# Patient Record
Sex: MALE | Race: OTHER | HISPANIC OR LATINO | URBAN - METROPOLITAN AREA
[De-identification: names, ages, dates, MRNs, and addresses within clinical notes are randomized per-mention and may not be internally consistent; named-entity substitution may affect disease eponyms.]

---

## 2019-03-04 ENCOUNTER — EMERGENCY (EMERGENCY)
Facility: HOSPITAL | Age: 54
LOS: 1 days | Discharge: HOME | End: 2019-03-04
Attending: EMERGENCY MEDICINE | Admitting: EMERGENCY MEDICINE
Payer: MEDICAID

## 2019-03-04 VITALS
OXYGEN SATURATION: 100 % | TEMPERATURE: 98 F | DIASTOLIC BLOOD PRESSURE: 88 MMHG | HEART RATE: 95 BPM | RESPIRATION RATE: 18 BRPM | SYSTOLIC BLOOD PRESSURE: 161 MMHG

## 2019-03-04 LAB
ALBUMIN SERPL ELPH-MCNC: 4.1 G/DL — SIGNIFICANT CHANGE UP (ref 3.3–5)
ALP SERPL-CCNC: 55 U/L — SIGNIFICANT CHANGE UP (ref 40–120)
ALT FLD-CCNC: 33 U/L — SIGNIFICANT CHANGE UP (ref 4–41)
ANION GAP SERPL CALC-SCNC: 11 MMO/L — SIGNIFICANT CHANGE UP (ref 7–14)
APPEARANCE UR: CLEAR — SIGNIFICANT CHANGE UP
AST SERPL-CCNC: 14 U/L — SIGNIFICANT CHANGE UP (ref 4–40)
BASOPHILS # BLD AUTO: 0.03 K/UL — SIGNIFICANT CHANGE UP (ref 0–0.2)
BASOPHILS NFR BLD AUTO: 0.5 % — SIGNIFICANT CHANGE UP (ref 0–2)
BILIRUB SERPL-MCNC: 0.5 MG/DL — SIGNIFICANT CHANGE UP (ref 0.2–1.2)
BILIRUB UR-MCNC: NEGATIVE — SIGNIFICANT CHANGE UP
BLOOD UR QL VISUAL: NEGATIVE — SIGNIFICANT CHANGE UP
BUN SERPL-MCNC: 15 MG/DL — SIGNIFICANT CHANGE UP (ref 7–23)
CALCIUM SERPL-MCNC: 9.4 MG/DL — SIGNIFICANT CHANGE UP (ref 8.4–10.5)
CHLORIDE SERPL-SCNC: 103 MMOL/L — SIGNIFICANT CHANGE UP (ref 98–107)
CO2 SERPL-SCNC: 28 MMOL/L — SIGNIFICANT CHANGE UP (ref 22–31)
COLOR SPEC: YELLOW — SIGNIFICANT CHANGE UP
CREAT SERPL-MCNC: 0.94 MG/DL — SIGNIFICANT CHANGE UP (ref 0.5–1.3)
EOSINOPHIL # BLD AUTO: 0.17 K/UL — SIGNIFICANT CHANGE UP (ref 0–0.5)
EOSINOPHIL NFR BLD AUTO: 2.7 % — SIGNIFICANT CHANGE UP (ref 0–6)
GLUCOSE SERPL-MCNC: 135 MG/DL — HIGH (ref 70–99)
GLUCOSE UR-MCNC: NEGATIVE — SIGNIFICANT CHANGE UP
HCT VFR BLD CALC: 44.6 % — SIGNIFICANT CHANGE UP (ref 39–50)
HGB BLD-MCNC: 14.4 G/DL — SIGNIFICANT CHANGE UP (ref 13–17)
IMM GRANULOCYTES NFR BLD AUTO: 0.3 % — SIGNIFICANT CHANGE UP (ref 0–1.5)
KETONES UR-MCNC: NEGATIVE — SIGNIFICANT CHANGE UP
LEUKOCYTE ESTERASE UR-ACNC: NEGATIVE — SIGNIFICANT CHANGE UP
LYMPHOCYTES # BLD AUTO: 2.2 K/UL — SIGNIFICANT CHANGE UP (ref 1–3.3)
LYMPHOCYTES # BLD AUTO: 34.6 % — SIGNIFICANT CHANGE UP (ref 13–44)
MCHC RBC-ENTMCNC: 30.1 PG — SIGNIFICANT CHANGE UP (ref 27–34)
MCHC RBC-ENTMCNC: 32.3 % — SIGNIFICANT CHANGE UP (ref 32–36)
MCV RBC AUTO: 93.1 FL — SIGNIFICANT CHANGE UP (ref 80–100)
MONOCYTES # BLD AUTO: 0.52 K/UL — SIGNIFICANT CHANGE UP (ref 0–0.9)
MONOCYTES NFR BLD AUTO: 8.2 % — SIGNIFICANT CHANGE UP (ref 2–14)
NEUTROPHILS # BLD AUTO: 3.42 K/UL — SIGNIFICANT CHANGE UP (ref 1.8–7.4)
NEUTROPHILS NFR BLD AUTO: 53.7 % — SIGNIFICANT CHANGE UP (ref 43–77)
NITRITE UR-MCNC: NEGATIVE — SIGNIFICANT CHANGE UP
NRBC # FLD: 0 K/UL — LOW (ref 25–125)
PH UR: 5.5 — SIGNIFICANT CHANGE UP (ref 5–8)
PLATELET # BLD AUTO: 183 K/UL — SIGNIFICANT CHANGE UP (ref 150–400)
PMV BLD: 12.2 FL — SIGNIFICANT CHANGE UP (ref 7–13)
POTASSIUM SERPL-MCNC: 4.1 MMOL/L — SIGNIFICANT CHANGE UP (ref 3.5–5.3)
POTASSIUM SERPL-SCNC: 4.1 MMOL/L — SIGNIFICANT CHANGE UP (ref 3.5–5.3)
PROT SERPL-MCNC: 6.8 G/DL — SIGNIFICANT CHANGE UP (ref 6–8.3)
PROT UR-MCNC: NEGATIVE — SIGNIFICANT CHANGE UP
RBC # BLD: 4.79 M/UL — SIGNIFICANT CHANGE UP (ref 4.2–5.8)
RBC # FLD: 12.3 % — SIGNIFICANT CHANGE UP (ref 10.3–14.5)
SODIUM SERPL-SCNC: 142 MMOL/L — SIGNIFICANT CHANGE UP (ref 135–145)
SP GR SPEC: 1.03 — SIGNIFICANT CHANGE UP (ref 1–1.04)
UROBILINOGEN FLD QL: NORMAL — SIGNIFICANT CHANGE UP
WBC # BLD: 6.36 K/UL — SIGNIFICANT CHANGE UP (ref 3.8–10.5)
WBC # FLD AUTO: 6.36 K/UL — SIGNIFICANT CHANGE UP (ref 3.8–10.5)

## 2019-03-04 PROCEDURE — 74177 CT ABD & PELVIS W/CONTRAST: CPT | Mod: 26

## 2019-03-04 PROCEDURE — 99284 EMERGENCY DEPT VISIT MOD MDM: CPT

## 2019-03-04 RX ORDER — IBUPROFEN 200 MG
1 TABLET ORAL
Qty: 20 | Refills: 0
Start: 2019-03-04

## 2019-03-04 RX ORDER — SODIUM CHLORIDE 9 MG/ML
1000 INJECTION INTRAMUSCULAR; INTRAVENOUS; SUBCUTANEOUS ONCE
Qty: 0 | Refills: 0 | Status: COMPLETED | OUTPATIENT
Start: 2019-03-04 | End: 2019-03-04

## 2019-03-04 RX ORDER — SODIUM CHLORIDE 9 MG/ML
3 INJECTION INTRAMUSCULAR; INTRAVENOUS; SUBCUTANEOUS ONCE
Qty: 0 | Refills: 0 | Status: COMPLETED | OUTPATIENT
Start: 2019-03-04 | End: 2019-03-04

## 2019-03-04 RX ADMIN — SODIUM CHLORIDE 3 MILLILITER(S): 9 INJECTION INTRAMUSCULAR; INTRAVENOUS; SUBCUTANEOUS at 10:02

## 2019-03-04 RX ADMIN — SODIUM CHLORIDE 1000 MILLILITER(S): 9 INJECTION INTRAMUSCULAR; INTRAVENOUS; SUBCUTANEOUS at 10:02

## 2019-03-04 NOTE — ED PROVIDER NOTE - OBJECTIVE STATEMENT
pt c/o 10 days of intermittent lower quadrant abdominal pain and suprapubic pain, not worse with movement, pain is mild, has not been taking pain medication because pain is not at the level as per patient, denies n/v/d, denies fevers chills

## 2019-03-04 NOTE — ED PROVIDER NOTE - NSFOLLOWUPINSTRUCTIONS_ED_ALL_ED_FT
Take motrin 600mg every 8 hours as needed for pain.  Avoid any strenuous activity/heavy lifting.  Follow up with your PMD in 2 days.  Follow up with a general surgeon within 2 weeks.  Follow up with a GI specialist within 1-2 weeks.  Return to ED for any worsening pain, vomiting, fever.

## 2019-03-04 NOTE — ED PROVIDER NOTE - CLINICAL SUMMARY MEDICAL DECISION MAKING FREE TEXT BOX
Orin:  pt found to have b/l fat containing inguinal hernias which is likely cause of pt's pain.  will provide surgery follow up.

## 2019-03-04 NOTE — ED ADULT TRIAGE NOTE - CHIEF COMPLAINT QUOTE
Co lower abdominal pain x 5 days. Denies nausea, vomiting, hematuria. States he has had diarrhea x 7 days. Co burning with urination. Denies recent antibiotics. Denies pmh.

## 2019-03-04 NOTE — ED PROVIDER NOTE - PHYSICAL EXAMINATION
Gen:  alert, awake, no acute distress  HEENT:  atraumatic head, airway clear, pupils equal and round  CV:  rrr, nl S1, S2, no m/r/g  Pulm:  lungs CTA b/l  Abd: s/nt/nd, +BS  : normal testicular exam, non tender, normal lye, no appreciable hernias  Ext:  moving all extremities  Neuro:  grossly intact, no focal deficits  Skin:  clear, dry, intact  Psych: AOx3, normal affect, no apparent risk to self or others

## 2019-03-04 NOTE — ED ADULT NURSE NOTE - OBJECTIVE STATEMENT
Pt reports abd pain and diarrhea for 7 days. Patient denies any N/V, change in BM or PO intake. Patient denies any significant PMH, no hx of abd sx. Patient's labs drawn, #18g placed into right AC. Patient pending CT.

## 2019-03-04 NOTE — ED ADULT NURSE NOTE - CHPI ED NUR SYMPTOMS NEG
no dysuria/no hematuria/no nausea/no vomiting/no blood in stool/no burning urination/no abdominal distension/no chills/no fever

## 2024-01-08 ENCOUNTER — EMERGENCY (EMERGENCY)
Facility: HOSPITAL | Age: 59
LOS: 1 days | Discharge: HOME | End: 2024-01-08
Attending: EMERGENCY MEDICINE | Admitting: EMERGENCY MEDICINE
Payer: MEDICAID

## 2024-01-08 VITALS
OXYGEN SATURATION: 99 % | HEART RATE: 85 BPM | TEMPERATURE: 98 F | SYSTOLIC BLOOD PRESSURE: 168 MMHG | DIASTOLIC BLOOD PRESSURE: 79 MMHG | RESPIRATION RATE: 20 BRPM

## 2024-01-08 VITALS
DIASTOLIC BLOOD PRESSURE: 91 MMHG | SYSTOLIC BLOOD PRESSURE: 152 MMHG | TEMPERATURE: 99 F | RESPIRATION RATE: 16 BRPM | HEART RATE: 77 BPM | OXYGEN SATURATION: 99 %

## 2024-01-08 LAB
ALBUMIN SERPL ELPH-MCNC: 4.5 G/DL — SIGNIFICANT CHANGE UP (ref 3.3–5)
ALBUMIN SERPL ELPH-MCNC: 4.5 G/DL — SIGNIFICANT CHANGE UP (ref 3.3–5)
ALP SERPL-CCNC: 74 U/L — SIGNIFICANT CHANGE UP (ref 40–120)
ALP SERPL-CCNC: 74 U/L — SIGNIFICANT CHANGE UP (ref 40–120)
ALT FLD-CCNC: 44 U/L — HIGH (ref 4–41)
ALT FLD-CCNC: 44 U/L — HIGH (ref 4–41)
ANION GAP SERPL CALC-SCNC: 13 MMOL/L — SIGNIFICANT CHANGE UP (ref 7–14)
ANION GAP SERPL CALC-SCNC: 13 MMOL/L — SIGNIFICANT CHANGE UP (ref 7–14)
APTT BLD: 31.4 SEC — SIGNIFICANT CHANGE UP (ref 24.5–35.6)
APTT BLD: 31.4 SEC — SIGNIFICANT CHANGE UP (ref 24.5–35.6)
AST SERPL-CCNC: 21 U/L — SIGNIFICANT CHANGE UP (ref 4–40)
AST SERPL-CCNC: 21 U/L — SIGNIFICANT CHANGE UP (ref 4–40)
BILIRUB SERPL-MCNC: 0.7 MG/DL — SIGNIFICANT CHANGE UP (ref 0.2–1.2)
BILIRUB SERPL-MCNC: 0.7 MG/DL — SIGNIFICANT CHANGE UP (ref 0.2–1.2)
BLD GP AB SCN SERPL QL: NEGATIVE — SIGNIFICANT CHANGE UP
BLD GP AB SCN SERPL QL: NEGATIVE — SIGNIFICANT CHANGE UP
BUN SERPL-MCNC: 14 MG/DL — SIGNIFICANT CHANGE UP (ref 7–23)
BUN SERPL-MCNC: 14 MG/DL — SIGNIFICANT CHANGE UP (ref 7–23)
CALCIUM SERPL-MCNC: 9.5 MG/DL — SIGNIFICANT CHANGE UP (ref 8.4–10.5)
CALCIUM SERPL-MCNC: 9.5 MG/DL — SIGNIFICANT CHANGE UP (ref 8.4–10.5)
CHLORIDE SERPL-SCNC: 101 MMOL/L — SIGNIFICANT CHANGE UP (ref 98–107)
CHLORIDE SERPL-SCNC: 101 MMOL/L — SIGNIFICANT CHANGE UP (ref 98–107)
CO2 SERPL-SCNC: 27 MMOL/L — SIGNIFICANT CHANGE UP (ref 22–31)
CO2 SERPL-SCNC: 27 MMOL/L — SIGNIFICANT CHANGE UP (ref 22–31)
CREAT SERPL-MCNC: 0.97 MG/DL — SIGNIFICANT CHANGE UP (ref 0.5–1.3)
CREAT SERPL-MCNC: 0.97 MG/DL — SIGNIFICANT CHANGE UP (ref 0.5–1.3)
EGFR: 90 ML/MIN/1.73M2 — SIGNIFICANT CHANGE UP
EGFR: 90 ML/MIN/1.73M2 — SIGNIFICANT CHANGE UP
GLUCOSE SERPL-MCNC: 110 MG/DL — HIGH (ref 70–99)
GLUCOSE SERPL-MCNC: 110 MG/DL — HIGH (ref 70–99)
HCT VFR BLD CALC: 46.1 % — SIGNIFICANT CHANGE UP (ref 39–50)
HCT VFR BLD CALC: 46.1 % — SIGNIFICANT CHANGE UP (ref 39–50)
HGB BLD-MCNC: 15.3 G/DL — SIGNIFICANT CHANGE UP (ref 13–17)
HGB BLD-MCNC: 15.3 G/DL — SIGNIFICANT CHANGE UP (ref 13–17)
INR BLD: 1.01 RATIO — SIGNIFICANT CHANGE UP (ref 0.85–1.18)
INR BLD: 1.01 RATIO — SIGNIFICANT CHANGE UP (ref 0.85–1.18)
MCHC RBC-ENTMCNC: 30.6 PG — SIGNIFICANT CHANGE UP (ref 27–34)
MCHC RBC-ENTMCNC: 30.6 PG — SIGNIFICANT CHANGE UP (ref 27–34)
MCHC RBC-ENTMCNC: 33.2 GM/DL — SIGNIFICANT CHANGE UP (ref 32–36)
MCHC RBC-ENTMCNC: 33.2 GM/DL — SIGNIFICANT CHANGE UP (ref 32–36)
MCV RBC AUTO: 92.2 FL — SIGNIFICANT CHANGE UP (ref 80–100)
MCV RBC AUTO: 92.2 FL — SIGNIFICANT CHANGE UP (ref 80–100)
NRBC # BLD: 0 /100 WBCS — SIGNIFICANT CHANGE UP (ref 0–0)
NRBC # BLD: 0 /100 WBCS — SIGNIFICANT CHANGE UP (ref 0–0)
NRBC # FLD: 0 K/UL — SIGNIFICANT CHANGE UP (ref 0–0)
NRBC # FLD: 0 K/UL — SIGNIFICANT CHANGE UP (ref 0–0)
OB PNL STL: POSITIVE
OB PNL STL: POSITIVE
PLATELET # BLD AUTO: 232 K/UL — SIGNIFICANT CHANGE UP (ref 150–400)
PLATELET # BLD AUTO: 232 K/UL — SIGNIFICANT CHANGE UP (ref 150–400)
POTASSIUM SERPL-MCNC: 4.2 MMOL/L — SIGNIFICANT CHANGE UP (ref 3.5–5.3)
POTASSIUM SERPL-MCNC: 4.2 MMOL/L — SIGNIFICANT CHANGE UP (ref 3.5–5.3)
POTASSIUM SERPL-SCNC: 4.2 MMOL/L — SIGNIFICANT CHANGE UP (ref 3.5–5.3)
POTASSIUM SERPL-SCNC: 4.2 MMOL/L — SIGNIFICANT CHANGE UP (ref 3.5–5.3)
PROT SERPL-MCNC: 7.7 G/DL — SIGNIFICANT CHANGE UP (ref 6–8.3)
PROT SERPL-MCNC: 7.7 G/DL — SIGNIFICANT CHANGE UP (ref 6–8.3)
PROTHROM AB SERPL-ACNC: 11.3 SEC — SIGNIFICANT CHANGE UP (ref 9.5–13)
PROTHROM AB SERPL-ACNC: 11.3 SEC — SIGNIFICANT CHANGE UP (ref 9.5–13)
RBC # BLD: 5 M/UL — SIGNIFICANT CHANGE UP (ref 4.2–5.8)
RBC # BLD: 5 M/UL — SIGNIFICANT CHANGE UP (ref 4.2–5.8)
RBC # FLD: 12.6 % — SIGNIFICANT CHANGE UP (ref 10.3–14.5)
RBC # FLD: 12.6 % — SIGNIFICANT CHANGE UP (ref 10.3–14.5)
RH IG SCN BLD-IMP: POSITIVE — SIGNIFICANT CHANGE UP
RH IG SCN BLD-IMP: POSITIVE — SIGNIFICANT CHANGE UP
SODIUM SERPL-SCNC: 141 MMOL/L — SIGNIFICANT CHANGE UP (ref 135–145)
SODIUM SERPL-SCNC: 141 MMOL/L — SIGNIFICANT CHANGE UP (ref 135–145)
WBC # BLD: 7.88 K/UL — SIGNIFICANT CHANGE UP (ref 3.8–10.5)
WBC # BLD: 7.88 K/UL — SIGNIFICANT CHANGE UP (ref 3.8–10.5)
WBC # FLD AUTO: 7.88 K/UL — SIGNIFICANT CHANGE UP (ref 3.8–10.5)
WBC # FLD AUTO: 7.88 K/UL — SIGNIFICANT CHANGE UP (ref 3.8–10.5)

## 2024-01-08 PROCEDURE — 99284 EMERGENCY DEPT VISIT MOD MDM: CPT

## 2024-01-08 RX ORDER — PANTOPRAZOLE SODIUM 20 MG/1
40 TABLET, DELAYED RELEASE ORAL ONCE
Refills: 0 | Status: COMPLETED | OUTPATIENT
Start: 2024-01-08 | End: 2024-01-08

## 2024-01-08 RX ADMIN — PANTOPRAZOLE SODIUM 40 MILLIGRAM(S): 20 TABLET, DELAYED RELEASE ORAL at 19:52

## 2024-01-08 NOTE — ED ADULT NURSE NOTE - OBJECTIVE STATEMENT
Pt AOX4 c/o 2 episodes of bloody stool in past 3 days; pt had an episode today of jelly-like bright red stool, c/o intermittent mild cramp-like pain 1/110; no weakness, no SOB, 20G placed in Right AC, labs drawn and sent, medication given as per MD, awaiting further Md orders.

## 2024-01-08 NOTE — ED PROVIDER NOTE - PHYSICAL EXAMINATION
GEN - NAD; well appearing; A+O x3   HEAD - NC/AT   EYES- PERRL, EOMI  ENT: Airway patent, mmm  NECK: Neck supple  PULMONARY - CTA b/l, symmetric breath sounds.   CARDIAC -s1s2, RRR, no M,G,R  ABDOMEN - +BS, ND, NT, soft, no guarding, no rebound, no masses   BACK - no CVA tenderness  RECTAL: no blood noted on ALEXEY, no external hemorrhoids, normal tone   EXTREMITIES - FROM  NEUROLOGIC - alert, speech clear  PSYCH -nl mood/affect, nl insight.

## 2024-01-08 NOTE — ED PROVIDER NOTE - CLINICAL SUMMARY MEDICAL DECISION MAKING FREE TEXT BOX
Otherwise healthy male, no prior colonoscopies, presents to the emergency department with 2 episodes of bright red blood per rectum.  The first episode was on January 5, second episode occurred today.  States that there was a small amount of bright red blood mixed in with some brown stool.  Denies any rectal pain.  Has been having intermittent lower abdominal cramping over the last several months since returning from trip to [facility].  Denies any vomiting, fevers, weight loss, fatigue, back pain.  On my exam the patient is well-appearing, he has stable vital signs including no tachycardia or hypotension, his abdomen is soft, he has a normal rectal exam without any evidence of blood, patient does not take any anticoagulants or aspirin.  Plan to check hemoglobin, at this point in time I do not think the patient would benefit from CT imaging as he has no abdominal tenderness on exam, recommended the patient follow-up with GI doctor for colonoscopy, return precautions discussed at length.  Patient has follow-up appointment with his primary care doctor in 2 days, will also place referral here for GI. Otherwise healthy male, no prior colonoscopies, presents to the emergency department with 2 episodes of bright red blood per rectum.  The first episode was on January 5, second episode occurred today.  States that there was a small amount of bright red blood mixed in with some brown stool.  Denies any rectal pain.  Has been having intermittent lower abdominal cramping over the last several months since returning from trip to [facility].  Denies any vomiting, fevers, weight loss, fatigue, back pain.  On my exam the patient is well-appearing, he has stable vital signs including no tachycardia or hypotension, his abdomen is soft, he has a normal rectal exam without any evidence of blood, patient does not take any anticoagulants or aspirin.  Plan to check hemoglobin, at this point in time I do not think the patient would benefit from CT imaging as he has no abdominal tenderness on exam, recommended the patient follow-up with GI doctor for colonoscopy, return precautions discussed at length.  Patient has follow-up appointment with his primary care doctor in 2 days, will also place referral here for GI.

## 2024-01-08 NOTE — ED ADULT NURSE NOTE - NSFALLUNIVINTERV_ED_ALL_ED
Bed/Stretcher in lowest position, wheels locked, appropriate side rails in place/Call bell, personal items and telephone in reach/Instruct patient to call for assistance before getting out of bed/chair/stretcher/Non-slip footwear applied when patient is off stretcher/[place] to call system/Physically safe environment - no spills, clutter or unnecessary equipment/Purposeful proactive rounding/Room/bathroom lighting operational, light cord in reach Bed/Stretcher in lowest position, wheels locked, appropriate side rails in place/Call bell, personal items and telephone in reach/Instruct patient to call for assistance before getting out of bed/chair/stretcher/Non-slip footwear applied when patient is off stretcher/[place] to call system/Physically safe environment - no spills, clutter or unnecessary equipment/Purposeful proactive rounding/Room/bathroom lighting operational, light cord in reach

## 2024-01-08 NOTE — ED PROVIDER NOTE - NSFOLLOWUPINSTRUCTIONS_ED_ALL_ED_FT
Your blood work did not show any concerning findings of anemia or significant blood loss.  It is very important that you follow-up with a GI doctor to obtain a colonoscopy for further evaluation of your bleeding.  If you develop severe pain, fever, increase in bleeding please return to the emergency department and do not wait to follow-up.  Please see attached copy of your results and bring to your follow-up appoint with your primary care doctor on Wednesday.

## 2024-01-08 NOTE — ED PROVIDER NOTE - PATIENT PORTAL LINK FT
You can access the FollowMyHealth Patient Portal offered by [facility] by registering at the following website: [email or web address]. By joining [employer]’s FollowMyHealth portal, you will also be able to view your health information using other applications (apps) compatible with our system. You can access the FollowMyHealth Patient Portal offered by [facility] by registering at the following website: [email or web address]. By joining [employer]’s FollowMyHealth portal, you will also be able to view your health information using other applications (apps) compatible with our system.

## 2024-02-07 PROBLEM — Z00.00 ENCOUNTER FOR PREVENTIVE HEALTH EXAMINATION: Status: ACTIVE | Noted: 2024-02-07

## 2024-02-10 ENCOUNTER — APPOINTMENT (OUTPATIENT)
Dept: CT IMAGING | Facility: IMAGING CENTER | Age: 59
End: 2024-02-10

## 2024-02-13 ENCOUNTER — APPOINTMENT (OUTPATIENT)
Dept: CT IMAGING | Facility: IMAGING CENTER | Age: 59
End: 2024-02-13

## 2024-02-13 ENCOUNTER — OUTPATIENT (OUTPATIENT)
Dept: OUTPATIENT SERVICES | Facility: HOSPITAL | Age: 59
LOS: 1 days | End: 2024-02-13
Payer: MEDICAID

## 2024-02-13 DIAGNOSIS — Z00.00 ENCOUNTER FOR GENERAL ADULT MEDICAL EXAMINATION WITHOUT ABNORMAL FINDINGS: ICD-10-CM

## 2024-02-13 DIAGNOSIS — C18.9 MALIGNANT NEOPLASM OF COLON, UNSPECIFIED: ICD-10-CM

## 2024-02-13 PROCEDURE — 74177 CT ABD & PELVIS W/CONTRAST: CPT

## 2024-02-13 PROCEDURE — 74177 CT ABD & PELVIS W/CONTRAST: CPT | Mod: 26

## 2024-02-21 ENCOUNTER — OUTPATIENT (OUTPATIENT)
Dept: OUTPATIENT SERVICES | Facility: HOSPITAL | Age: 59
LOS: 1 days | End: 2024-02-21

## 2024-02-21 VITALS
HEIGHT: 69 IN | RESPIRATION RATE: 16 BRPM | TEMPERATURE: 98 F | DIASTOLIC BLOOD PRESSURE: 90 MMHG | OXYGEN SATURATION: 98 % | SYSTOLIC BLOOD PRESSURE: 141 MMHG | WEIGHT: 233.91 LBS | HEART RATE: 84 BPM

## 2024-02-21 DIAGNOSIS — I10 ESSENTIAL (PRIMARY) HYPERTENSION: ICD-10-CM

## 2024-02-21 DIAGNOSIS — C18.7 MALIGNANT NEOPLASM OF SIGMOID COLON: ICD-10-CM

## 2024-02-21 DIAGNOSIS — Z98.890 OTHER SPECIFIED POSTPROCEDURAL STATES: Chronic | ICD-10-CM

## 2024-02-21 LAB
A1C WITH ESTIMATED AVERAGE GLUCOSE RESULT: 6.1 % — HIGH (ref 4–5.6)
ESTIMATED AVERAGE GLUCOSE: 128 — SIGNIFICANT CHANGE UP

## 2024-02-21 RX ORDER — CHLORHEXIDINE GLUCONATE 213 G/1000ML
1 SOLUTION TOPICAL ONCE
Refills: 0 | Status: COMPLETED | OUTPATIENT
Start: 2024-02-26 | End: 2024-02-26

## 2024-02-21 RX ORDER — SODIUM CHLORIDE 9 MG/ML
1000 INJECTION, SOLUTION INTRAVENOUS
Refills: 0 | Status: DISCONTINUED | OUTPATIENT
Start: 2024-02-26 | End: 2024-02-26

## 2024-02-21 NOTE — H&P PST ADULT - NSICDXPASTMEDICALHX_GEN_ALL_CORE_FT
PAST MEDICAL HISTORY:  HTN (hypertension)     Malignant neoplasm of sigmoid colon     No pertinent past medical history      PAST MEDICAL HISTORY:  HTN (hypertension)     Malignant neoplasm of sigmoid colon

## 2024-02-21 NOTE — H&P PST ADULT - HISTORY OF PRESENT ILLNESS
58 y.o. male presents to [facility] for evaluation scheduled for laparoscopic partial colectomy, reports h/o bright red blood per rectum in 01/08/2024, s/p ER visit at [facility], s/p labs and colonoscopy, polypectomy, positive for malignancy, c/o intermittent abdominal discomfort, bloating, denies further episodes of BRBPR, N/V, changes in bowel habits, unintentional weight loss, fatigue 58 y.o. male with HTN, presents to [facility] for evaluation scheduled for laparoscopic partial colectomy, reports h/o bright red blood per rectum in 01/08/2024, s/p ER visit at [facility], s/p labs and colonoscopy, polypectomy, positive for malignancy, c/o intermittent abdominal discomfort, bloating, denies further episodes of BRBPR, N/V, changes in bowel habits, unintentional weight loss, fatigue

## 2024-02-21 NOTE — H&P PST ADULT - GASTROINTESTINAL
details… soft/nontender/nondistended/normal active bowel sounds soft/nontender/nondistended/normal active bowel sounds/no guarding/no palpable anushka

## 2024-02-21 NOTE — H&P PST ADULT - NSANTHOSAYNRD_GEN_A_CORE
No. KYMBERLY screening performed.  STOP BANG Legend: 0-2 = LOW Risk; 3-4 = INTERMEDIATE Risk; 5-8 = HIGH Risk

## 2024-02-21 NOTE — H&P PST ADULT - PROBLEM SELECTOR PLAN 2
pt instructed to continue medications as prescribed pt instructed to continue medications as prescribed and take amlodipine on am of the procedure with a sip of water

## 2024-02-21 NOTE — H&P PST ADULT - PROBLEM SELECTOR PLAN 1
pt scheduled for laparoscoic partial colecotmy on 02/26/24  Preop instructions provided. Pt verbalized understanding.   Pepcid for GI prophylaxis with written and verbal instruction provided    written and verbal instructions with teach back on chlorhexidine shampoo provided,  pt verbalized understanding   CBC, CMP, T&S, PT/INR in chart, A1C, T&S, done @ PST, EKG from PCP office requested pt scheduled for laparoscoic partial colecotmy on 02/26/24  Preop instructions provided. Pt verbalized understanding.   Pepcid for GI prophylaxis with written and verbal instruction provided    written and verbal instructions with teach back on chlorhexidine shampoo provided,  pt verbalized understanding   CBC, CMP, T&S, PT/INR in chart, A1C, T&S, done @ PST, EKG from PCP office requested, in chart

## 2024-02-21 NOTE — H&P PST ADULT - COMMENTS
activity: walking, ADL, house chores, climbs stairs, house chores, yard work, carrying heavy load  METS: 6.61 (DASI)

## 2024-02-22 LAB
BLD GP AB SCN SERPL QL: NEGATIVE — SIGNIFICANT CHANGE UP
RH IG SCN BLD-IMP: POSITIVE — SIGNIFICANT CHANGE UP

## 2024-02-23 NOTE — ASU PATIENT PROFILE, ADULT - PATIENT'S HEIGHT AND WEIGHT RECORDED IN THE VITAL SIGNS FLOWSHEET
Interval History:     Review of Systems   Unable to perform ROS: Mental status change   Objective:     Vital Signs (Most Recent):  Temp: 97.7 °F (36.5 °C) (03/23/22 1118)  Pulse: 71 (03/23/22 1309)  Resp: 16 (03/23/22 1309)  BP: 127/65 (03/23/22 1118)  SpO2: 97 % (03/23/22 1309) Vital Signs (24h Range):  Temp:  [97.7 °F (36.5 °C)-98.1 °F (36.7 °C)] 97.7 °F (36.5 °C)  Pulse:  [68-81] 71  Resp:  [16-20] 16  SpO2:  [91 %-100 %] 97 %  BP: (120-159)/(59-84) 127/65     Weight: 61.2 kg (134 lb 14.7 oz)  Body mass index is 24.68 kg/m².    Intake/Output Summary (Last 24 hours) at 3/23/2022 1330  Last data filed at 3/23/2022 0552  Gross per 24 hour   Intake 2329.13 ml   Output 1500 ml   Net 829.13 ml      Physical Exam  Constitutional:       Comments: no verbal response , does not follow commands, withdraws to pain stimulus    HENT:      Head: Normocephalic and atraumatic.   Eyes:      Pupils: Pupils are equal, round, and reactive to light.      Comments: (+) corneal reflex    Cardiovascular:      Rate and Rhythm: Normal rate.      Heart sounds: Normal heart sounds.   Pulmonary:      Breath sounds: Rales present.      Comments: Breath sounds are coarse and bronchial  Abdominal:      General: Bowel sounds are normal.   Genitourinary:     Comments: Abreu catheter in place  Musculoskeletal:      Cervical back: Neck supple.      Right lower leg: No edema.      Left lower leg: No edema.   Neurological:      Comments:  Eyes are open, seems awake and occasionally tracks voice , other time not.   Intermittent spontaneous upper extremities movements  does not seem purposeful.        Significant Labs: All pertinent labs within the past 24 hours have been reviewed.      Significant Imaging: I have reviewed all pertinent imaging results/findings within the past 24 hours.     yes

## 2024-02-25 ENCOUNTER — TRANSCRIPTION ENCOUNTER (OUTPATIENT)
Age: 59
End: 2024-02-25

## 2024-02-26 ENCOUNTER — INPATIENT (INPATIENT)
Facility: HOSPITAL | Age: 59
LOS: 5 days | Discharge: HOME | End: 2024-03-03
Payer: MEDICAID

## 2024-02-26 VITALS
DIASTOLIC BLOOD PRESSURE: 76 MMHG | WEIGHT: 233.91 LBS | OXYGEN SATURATION: 100 % | RESPIRATION RATE: 15 BRPM | HEIGHT: 69 IN | HEART RATE: 86 BPM | TEMPERATURE: 100 F | SYSTOLIC BLOOD PRESSURE: 152 MMHG

## 2024-02-26 DIAGNOSIS — Z98.890 OTHER SPECIFIED POSTPROCEDURAL STATES: Chronic | ICD-10-CM

## 2024-02-26 DIAGNOSIS — C18.7 MALIGNANT NEOPLASM OF SIGMOID COLON: ICD-10-CM

## 2024-02-26 LAB
ANION GAP SERPL CALC-SCNC: 14 MMOL/L — SIGNIFICANT CHANGE UP (ref 7–14)
BLD GP AB SCN SERPL QL: NEGATIVE — SIGNIFICANT CHANGE UP
BUN SERPL-MCNC: 13 MG/DL — SIGNIFICANT CHANGE UP (ref 7–23)
CALCIUM SERPL-MCNC: 9 MG/DL — SIGNIFICANT CHANGE UP (ref 8.4–10.5)
CHLORIDE SERPL-SCNC: 100 MMOL/L — SIGNIFICANT CHANGE UP (ref 98–107)
CO2 SERPL-SCNC: 24 MMOL/L — SIGNIFICANT CHANGE UP (ref 22–31)
CREAT SERPL-MCNC: 0.98 MG/DL — SIGNIFICANT CHANGE UP (ref 0.5–1.3)
EGFR: 89 ML/MIN/1.73M2 — SIGNIFICANT CHANGE UP
GLUCOSE BLDC GLUCOMTR-MCNC: 113 MG/DL — HIGH (ref 70–99)
GLUCOSE SERPL-MCNC: 168 MG/DL — HIGH (ref 70–99)
HCT VFR BLD CALC: 38.7 % — LOW (ref 39–50)
HGB BLD-MCNC: 13.2 G/DL — SIGNIFICANT CHANGE UP (ref 13–17)
MAGNESIUM SERPL-MCNC: 1.6 MG/DL — SIGNIFICANT CHANGE UP (ref 1.6–2.6)
MCHC RBC-ENTMCNC: 30.9 PG — SIGNIFICANT CHANGE UP (ref 27–34)
MCHC RBC-ENTMCNC: 34.1 GM/DL — SIGNIFICANT CHANGE UP (ref 32–36)
MCV RBC AUTO: 90.6 FL — SIGNIFICANT CHANGE UP (ref 80–100)
NRBC # BLD: 0 /100 WBCS — SIGNIFICANT CHANGE UP (ref 0–0)
NRBC # FLD: 0 K/UL — SIGNIFICANT CHANGE UP (ref 0–0)
PHOSPHATE SERPL-MCNC: 2.6 MG/DL — SIGNIFICANT CHANGE UP (ref 2.5–4.5)
PLATELET # BLD AUTO: 197 K/UL — SIGNIFICANT CHANGE UP (ref 150–400)
POTASSIUM SERPL-MCNC: 4.1 MMOL/L — SIGNIFICANT CHANGE UP (ref 3.5–5.3)
POTASSIUM SERPL-SCNC: 4.1 MMOL/L — SIGNIFICANT CHANGE UP (ref 3.5–5.3)
RBC # BLD: 4.27 M/UL — SIGNIFICANT CHANGE UP (ref 4.2–5.8)
RBC # FLD: 12.3 % — SIGNIFICANT CHANGE UP (ref 10.3–14.5)
RH IG SCN BLD-IMP: POSITIVE — SIGNIFICANT CHANGE UP
SODIUM SERPL-SCNC: 138 MMOL/L — SIGNIFICANT CHANGE UP (ref 135–145)
WBC # BLD: 15.83 K/UL — HIGH (ref 3.8–10.5)
WBC # FLD AUTO: 15.83 K/UL — HIGH (ref 3.8–10.5)

## 2024-02-26 PROCEDURE — 88305 TISSUE EXAM BY PATHOLOGIST: CPT | Mod: 26

## 2024-02-26 PROCEDURE — 93010 ELECTROCARDIOGRAM REPORT: CPT

## 2024-02-26 PROCEDURE — 88309 TISSUE EXAM BY PATHOLOGIST: CPT | Mod: 26

## 2024-02-26 PROCEDURE — 88304 TISSUE EXAM BY PATHOLOGIST: CPT | Mod: 26

## 2024-02-26 PROCEDURE — 88342 IMHCHEM/IMCYTCHM 1ST ANTB: CPT | Mod: 26

## 2024-02-26 PROCEDURE — 88341 IMHCHEM/IMCYTCHM EA ADD ANTB: CPT | Mod: 26

## 2024-02-26 DEVICE — STAPLER COVIDIEN TA 60 BLUE: Type: IMPLANTABLE DEVICE | Status: FUNCTIONAL

## 2024-02-26 DEVICE — STAPLER ECHELON CIRCULAR POWERED 29MM: Type: IMPLANTABLE DEVICE | Status: FUNCTIONAL

## 2024-02-26 DEVICE — STAPLER COVIDIEN TA 60 BLUE RELOAD: Type: IMPLANTABLE DEVICE | Status: FUNCTIONAL

## 2024-02-26 RX ORDER — HYDROMORPHONE HYDROCHLORIDE 2 MG/ML
30 INJECTION INTRAMUSCULAR; INTRAVENOUS; SUBCUTANEOUS
Refills: 0 | Status: DISCONTINUED | OUTPATIENT
Start: 2024-02-26 | End: 2024-02-29

## 2024-02-26 RX ORDER — SODIUM CHLORIDE 9 MG/ML
1000 INJECTION, SOLUTION INTRAVENOUS
Refills: 0 | Status: DISCONTINUED | OUTPATIENT
Start: 2024-02-26 | End: 2024-02-28

## 2024-02-26 RX ORDER — ACETAMINOPHEN 500 MG
1000 TABLET ORAL EVERY 6 HOURS
Refills: 0 | Status: COMPLETED | OUTPATIENT
Start: 2024-02-26 | End: 2024-02-27

## 2024-02-26 RX ORDER — ONDANSETRON 8 MG/1
4 TABLET, FILM COATED ORAL EVERY 6 HOURS
Refills: 0 | Status: DISCONTINUED | OUTPATIENT
Start: 2024-02-26 | End: 2024-03-03

## 2024-02-26 RX ORDER — INFLUENZA VIRUS VACCINE 15; 15; 15; 15 UG/.5ML; UG/.5ML; UG/.5ML; UG/.5ML
0.5 SUSPENSION INTRAMUSCULAR ONCE
Refills: 0 | Status: DISCONTINUED | OUTPATIENT
Start: 2024-02-26 | End: 2024-03-03

## 2024-02-26 RX ORDER — ACETAMINOPHEN 500 MG
1 TABLET ORAL
Refills: 0 | DISCHARGE

## 2024-02-26 RX ORDER — AMLODIPINE BESYLATE 2.5 MG/1
1 TABLET ORAL
Refills: 0 | DISCHARGE

## 2024-02-26 RX ORDER — NALOXONE HYDROCHLORIDE 4 MG/.1ML
0.1 SPRAY NASAL
Refills: 0 | Status: DISCONTINUED | OUTPATIENT
Start: 2024-02-26 | End: 2024-03-03

## 2024-02-26 RX ORDER — HEPARIN SODIUM 5000 [USP'U]/ML
5000 INJECTION INTRAVENOUS; SUBCUTANEOUS EVERY 8 HOURS
Refills: 0 | Status: DISCONTINUED | OUTPATIENT
Start: 2024-02-26 | End: 2024-03-03

## 2024-02-26 RX ADMIN — HYDROMORPHONE HYDROCHLORIDE 30 MILLILITER(S): 2 INJECTION INTRAMUSCULAR; INTRAVENOUS; SUBCUTANEOUS at 20:51

## 2024-02-26 RX ADMIN — HYDROMORPHONE HYDROCHLORIDE 30 MILLILITER(S): 2 INJECTION INTRAMUSCULAR; INTRAVENOUS; SUBCUTANEOUS at 17:05

## 2024-02-26 RX ADMIN — CHLORHEXIDINE GLUCONATE 1 APPLICATION(S): 213 SOLUTION TOPICAL at 18:52

## 2024-02-26 RX ADMIN — Medication 400 MILLIGRAM(S): at 23:31

## 2024-02-26 NOTE — CHART NOTE - NSCHARTNOTEFT_GEN_A_CORE
Post Operative Note  Patient: FREDY WILSON 58y (1965) Male   MRN: [id number]  Location: [place] [address] A  Visit: 02-26-24 Inpatient  Patient seen and examined at 22:03 on 02-      Procedure: S/P lap-assisted partial colectomy    Subjective: Patient seen and examined post operatively. Reports some abdominal pain that responds to the PCA. Denies nausea, vomiting, fever, chills, chest pain, SOB.      Objective:    Physical Examination:  General: NAD, resting comfortably in bed  HEENT: Normocephalic atraumatic  Respiratory: Nonlabored respirations, normal CW expansion, on RA  Cardio: S1S2, sinus tachycardia  Abdomen: Softly distended, appropriately tender, Some strikethrough at superior border of midline incision, lap sites c/d/i, JPx1 sanguinous  Vascular: Extremities are warm and well perfused    Imaging:  No post-op imaging studies    Assessment:  58yMale patient S/P lap-assisted partial colectomy for colon cancer. Patient's pain is controlled with the PCA. Patient tachycardic on pulse ox, but asymptomatic. Other vital signs are within normal limits, and UOP is appropriate. EKG performed, demonstrating sinus tachycardia.     Plan:  - Pain control: standing tylenol and PCA  - Diet: NPO w/ sips and chips  - 0.5mIVF  - Ugalde  - Activity: OOBAT  - DVT ppx: SCD's & SQH  - Repeat CBC/BMP w/ type and screen  - Strict I/Os    [facility]  [id number]

## 2024-02-26 NOTE — BRIEF OPERATIVE NOTE - OPERATION/FINDINGS
Laparoscopic assisted partial colectomy. Sigmoid colon mobilization followed by laparotomy. Anastomosis was done and no leak was encountered.   ODALIS drain was placed in the RLQ.

## 2024-02-26 NOTE — PATIENT PROFILE ADULT - FALL HARM RISK - HARM RISK INTERVENTIONS

## 2024-02-27 LAB
ANION GAP SERPL CALC-SCNC: 13 MMOL/L — SIGNIFICANT CHANGE UP (ref 7–14)
BUN SERPL-MCNC: 12 MG/DL — SIGNIFICANT CHANGE UP (ref 7–23)
CALCIUM SERPL-MCNC: 9.1 MG/DL — SIGNIFICANT CHANGE UP (ref 8.4–10.5)
CHLORIDE SERPL-SCNC: 100 MMOL/L — SIGNIFICANT CHANGE UP (ref 98–107)
CO2 SERPL-SCNC: 25 MMOL/L — SIGNIFICANT CHANGE UP (ref 22–31)
CREAT SERPL-MCNC: 0.99 MG/DL — SIGNIFICANT CHANGE UP (ref 0.5–1.3)
EGFR: 88 ML/MIN/1.73M2 — SIGNIFICANT CHANGE UP
GLUCOSE SERPL-MCNC: 114 MG/DL — HIGH (ref 70–99)
HCT VFR BLD CALC: 37.1 % — LOW (ref 39–50)
HGB BLD-MCNC: 12.5 G/DL — LOW (ref 13–17)
MAGNESIUM SERPL-MCNC: 1.7 MG/DL — SIGNIFICANT CHANGE UP (ref 1.6–2.6)
MCHC RBC-ENTMCNC: 30.7 PG — SIGNIFICANT CHANGE UP (ref 27–34)
MCHC RBC-ENTMCNC: 33.7 GM/DL — SIGNIFICANT CHANGE UP (ref 32–36)
MCV RBC AUTO: 91.2 FL — SIGNIFICANT CHANGE UP (ref 80–100)
NRBC # BLD: 0 /100 WBCS — SIGNIFICANT CHANGE UP (ref 0–0)
NRBC # FLD: 0 K/UL — SIGNIFICANT CHANGE UP (ref 0–0)
PHOSPHATE SERPL-MCNC: 3.7 MG/DL — SIGNIFICANT CHANGE UP (ref 2.5–4.5)
PLATELET # BLD AUTO: 195 K/UL — SIGNIFICANT CHANGE UP (ref 150–400)
POTASSIUM SERPL-MCNC: 4.1 MMOL/L — SIGNIFICANT CHANGE UP (ref 3.5–5.3)
POTASSIUM SERPL-SCNC: 4.1 MMOL/L — SIGNIFICANT CHANGE UP (ref 3.5–5.3)
RBC # BLD: 4.07 M/UL — LOW (ref 4.2–5.8)
RBC # FLD: 12.5 % — SIGNIFICANT CHANGE UP (ref 10.3–14.5)
SODIUM SERPL-SCNC: 138 MMOL/L — SIGNIFICANT CHANGE UP (ref 135–145)
WBC # BLD: 15.41 K/UL — HIGH (ref 3.8–10.5)
WBC # FLD AUTO: 15.41 K/UL — HIGH (ref 3.8–10.5)

## 2024-02-27 RX ORDER — METOCLOPRAMIDE HCL 10 MG
10 TABLET ORAL EVERY 8 HOURS
Refills: 0 | Status: COMPLETED | OUTPATIENT
Start: 2024-02-27 | End: 2024-02-29

## 2024-02-27 RX ORDER — ACETAMINOPHEN 500 MG
1000 TABLET ORAL EVERY 6 HOURS
Refills: 0 | Status: DISCONTINUED | OUTPATIENT
Start: 2024-02-28 | End: 2024-02-28

## 2024-02-27 RX ADMIN — HEPARIN SODIUM 5000 UNIT(S): 5000 INJECTION INTRAVENOUS; SUBCUTANEOUS at 05:33

## 2024-02-27 RX ADMIN — HYDROMORPHONE HYDROCHLORIDE 30 MILLILITER(S): 2 INJECTION INTRAMUSCULAR; INTRAVENOUS; SUBCUTANEOUS at 20:17

## 2024-02-27 RX ADMIN — Medication 1000 MILLIGRAM(S): at 17:16

## 2024-02-27 RX ADMIN — Medication 10 MILLIGRAM(S): at 13:32

## 2024-02-27 RX ADMIN — HEPARIN SODIUM 5000 UNIT(S): 5000 INJECTION INTRAVENOUS; SUBCUTANEOUS at 13:31

## 2024-02-27 RX ADMIN — Medication 10 MILLIGRAM(S): at 22:11

## 2024-02-27 RX ADMIN — SODIUM CHLORIDE 75 MILLILITER(S): 9 INJECTION, SOLUTION INTRAVENOUS at 12:06

## 2024-02-27 RX ADMIN — SODIUM CHLORIDE 125 MILLILITER(S): 9 INJECTION, SOLUTION INTRAVENOUS at 11:06

## 2024-02-27 RX ADMIN — Medication 400 MILLIGRAM(S): at 05:33

## 2024-02-27 RX ADMIN — Medication 1000 MILLIGRAM(S): at 06:03

## 2024-02-27 RX ADMIN — Medication 1000 MILLIGRAM(S): at 00:01

## 2024-02-27 RX ADMIN — Medication 1000 MILLIGRAM(S): at 11:22

## 2024-02-27 RX ADMIN — Medication 1000 MILLIGRAM(S): at 23:59

## 2024-02-27 RX ADMIN — Medication 400 MILLIGRAM(S): at 11:07

## 2024-02-27 RX ADMIN — HEPARIN SODIUM 5000 UNIT(S): 5000 INJECTION INTRAVENOUS; SUBCUTANEOUS at 22:11

## 2024-02-27 RX ADMIN — HYDROMORPHONE HYDROCHLORIDE 30 MILLILITER(S): 2 INJECTION INTRAMUSCULAR; INTRAVENOUS; SUBCUTANEOUS at 08:23

## 2024-02-27 RX ADMIN — Medication 400 MILLIGRAM(S): at 17:01

## 2024-02-27 NOTE — PROGRESS NOTE ADULT - NS ATTEND AMEND GEN_ALL_CORE FT
Pt seen/examined, overall feeling well. Denies N/V but feels bloated. No bowel function. Abd softly distended. Wound c/d/i. Drain SSF    - cont pain management  - d/c Ugalde  - ambulate  - decrease  IF to 75cc/h  - sips/chips

## 2024-02-27 NOTE — PROGRESS NOTE ADULT - ASSESSMENT
58 year old male patient S/P lap-assisted partial colectomy for colon cancer. Pos-top course c/b sinus tach, improved and recovering appropriately.     Plan:  - Pain control: standing tylenol and PCA  - Diet: Sips of clears today  - Continue IVF, will decrease rate when tolerating adequate PO   - Discontinue dumont, TOV today   - ODALIS drain teaching   - Resume home Norvasc as BP allows  - Activity: OOBAT  - DVT ppx: SCD's & SQH    Liat Rose PA-C  [facility]  [id number]  Please page with any questions or concerns    58 year old male patient S/P lap-assisted partial colectomy for colon cancer. Pos-top course c/b sinus tach, improved and recovering appropriately.     Plan:  - Pain control: standing tylenol and PCA  - Diet: Sips and chips  - Continue IVF, will decrease rate when tolerating adequate PO   - Discontinue dumont, TOV today   - ODALIS drain teaching   - Resume home Norvasc as BP allows  - Activity: OOBAT  - DVT ppx: SCD's & SQH    Liat Rose PA-C  [facility]  [id number]  Please page with any questions or concerns

## 2024-02-27 NOTE — PROGRESS NOTE ADULT - SUBJECTIVE AND OBJECTIVE BOX
Anesthesia Pain Management Service    SUBJECTIVE: Patient is doing well with IV PCA and no significant problems reported. Reports some gas pain, denies any incisional pain. No nausea or vomiting.    Pain Scale Score	At rest: _0__ 	With Activity: ___ 	[X ] Refer to charted pain scores    THERAPY:    [ ] IV PCA Morphine		[ ] 5 mg/mL	[ ] 1 mg/mL  [X ] IV PCA Hydromorphone	[ ] 5 mg/mL	[X ] 1 mg/mL  [ ] IV PCA Fentanyl		[ ] 50 micrograms/mL    Demand dose __0.2_ lockout __6_ (minutes) Continuous Rate _0__ Total: _6.3__  mg used (in past 24 hours)      MEDICATIONS  (STANDING):  acetaminophen   IVPB .. 1000 milliGRAM(s) IV Intermittent every 6 hours  heparin   Injectable 5000 Unit(s) SubCutaneous every 8 hours  HYDROmorphone PCA (1 mG/mL) 30 milliLiter(s) PCA Continuous PCA Continuous  influenza   Vaccine 0.5 milliLiter(s) IntraMuscular once  lactated ringers. 1000 milliLiter(s) (75 mL/Hr) IV Continuous <Continuous>  metoclopramide Injectable 10 milliGRAM(s) IV Push every 8 hours    MEDICATIONS  (PRN):  naloxone Injectable 0.1 milliGRAM(s) IV Push every 3 minutes PRN For ANY of the following changes in patient status:  A. RR LESS THAN 10 breaths per minute, B. Oxygen saturation LESS THAN 90%, C. Sedation score of 6  ondansetron Injectable 4 milliGRAM(s) IV Push every 6 hours PRN Nausea      OBJECTIVE:    Sedation Score:	[ X] Alert	[ ] Drowsy 	[ ] Arousable	[ ] Asleep	[ ] Unresponsive    Side Effects:	[X ] None	[ ] Nausea	[ ] Vomiting	[ ] Pruritus  		[ ] Other:    Vital Signs Last 24 Hrs  T(C): 36.8 (27 Feb 2024 09:15), Max: 37.4 (27 Feb 2024 02:00)  T(F): 98.2 (27 Feb 2024 09:15), Max: 99.3 (27 Feb 2024 02:00)  HR: 76 (27 Feb 2024 09:15) (76 - 107)  BP: 115/67 (27 Feb 2024 09:15) (115/67 - 146/73)  BP(mean): 81 (26 Feb 2024 20:00) (73 - 88)  RR: 17 (27 Feb 2024 09:15) (11 - 19)  SpO2: 96% (27 Feb 2024 09:15) (92% - 100%)    Parameters below as of 27 Feb 2024 09:15  Patient On (Oxygen Delivery Method): room air        ASSESSMENT/ PLAN    Therapy to  be:	[ X] Continue   [ ] Discontinued   [ ] Change to prn Analgesics    Documentation and Verification of current medications:   [X] Done	[ ] Not done, not elligible    Comments: Recommend non-opioid adjuvant analgesics to be used when possible and when allowed by primary surgical team. Continue PCA.    Progress Note written now but Patient was seen earlier.

## 2024-02-27 NOTE — PROGRESS NOTE ADULT - SUBJECTIVE AND OBJECTIVE BOX
A TEAM SURGERY DAILY PROGRESS NOTE    STATUS POST:  Lap-assisted partial colectomy     POST OPERATIVE DAY #: 1    Vital Signs Last 24 Hrs  T(C): 36.8 (27 Feb 2024 09:15), Max: 37.7 (26 Feb 2024 10:57)  T(F): 98.2 (27 Feb 2024 09:15), Max: 99.9 (26 Feb 2024 10:57)  HR: 76 (27 Feb 2024 09:15) (76 - 107)  BP: 115/67 (27 Feb 2024 09:15) (115/67 - 152/76)  BP(mean): 81 (26 Feb 2024 20:00) (73 - 88)  RR: 17 (27 Feb 2024 09:15) (11 - 19)  SpO2: 96% (27 Feb 2024 09:15) (92% - 100%)    Parameters below as of 27 Feb 2024 09:15  Patient On (Oxygen Delivery Method): room air      SUBJECTIVE: Patient seen and examined at bedside. Overnight patient tachycardic, EKG sinus tach patient asymptomatic otherwise VSS and with adequate UOP. Resolved this AM. Reports pain is controlled on PCA, delivered 2.8mg overnight. Has not had PO, denies nausea or vomiting, without return of bowel function. Denies chest pain or SOB.      General Appearance: Appears well, NAD  Neck: Supple  Chest: Equal expansion bilaterally, equal breath sounds  CV: Pulse regular presently  Abdomen: Softly distended, appropriately tender to midline incision without rebound or guarding. ODALIS x1 with sang > ss output. Steri strips superior portion with staining otherwise c/d/i  Extremities: Grossly symmetric, SCD's in place, no edema  Skin: Warm and dry     I&O's Summary    26 Feb 2024 07:01  -  27 Feb 2024 07:00  --------------------------------------------------------  IN: 1350 mL / OUT: 2260 mL / NET: -910 mL      I&O's Detail    26 Feb 2024 07:01  -  27 Feb 2024 07:00  --------------------------------------------------------  IN:    IV PiggyBack: 100 mL    Lactated Ringers: 1250 mL  Total IN: 1350 mL    OUT:    Bulb (mL): 60 mL    Indwelling Catheter - Urethral (mL): 2200 mL    Oral Fluid: 0 mL  Total OUT: 2260 mL    Total NET: -910 mL          MEDICATIONS  (STANDING):  acetaminophen   IVPB .. 1000 milliGRAM(s) IV Intermittent every 6 hours  heparin   Injectable 5000 Unit(s) SubCutaneous every 8 hours  HYDROmorphone PCA (1 mG/mL) 30 milliLiter(s) PCA Continuous PCA Continuous  influenza   Vaccine 0.5 milliLiter(s) IntraMuscular once  lactated ringers. 1000 milliLiter(s) (125 mL/Hr) IV Continuous <Continuous>    MEDICATIONS  (PRN):  naloxone Injectable 0.1 milliGRAM(s) IV Push every 3 minutes PRN For ANY of the following changes in patient status:  A. RR LESS THAN 10 breaths per minute, B. Oxygen saturation LESS THAN 90%, C. Sedation score of 6  ondansetron Injectable 4 milliGRAM(s) IV Push every 6 hours PRN Nausea      LABS:                        12.5   15.41 )-----------( 195      ( 27 Feb 2024 05:10 )             37.1     02-27    138  |  100  |  12  ----------------------------<  114<H>  4.1   |  25  |  0.99    Ca    9.1      27 Feb 2024 05:10  Phos  3.7     02-27  Mg     1.70     02-27        Urinalysis Basic - ( 27 Feb 2024 05:10 )    Color: x / Appearance: x / SG: x / pH: x  Gluc: 114 mg/dL / Ketone: x  / Bili: x / Urobili: x   Blood: x / Protein: x / Nitrite: x   Leuk Esterase: x / RBC: x / WBC x   Sq Epi: x / Non Sq Epi: x / Bacteria: x        RADIOLOGY & ADDITIONAL STUDIES: A TEAM SURGERY DAILY PROGRESS NOTE    STATUS POST:  Lap-assisted LAR    POST OPERATIVE DAY #: 1    Vital Signs Last 24 Hrs  T(C): 36.8 (27 Feb 2024 09:15), Max: 37.7 (26 Feb 2024 10:57)  T(F): 98.2 (27 Feb 2024 09:15), Max: 99.9 (26 Feb 2024 10:57)  HR: 76 (27 Feb 2024 09:15) (76 - 107)  BP: 115/67 (27 Feb 2024 09:15) (115/67 - 152/76)  BP(mean): 81 (26 Feb 2024 20:00) (73 - 88)  RR: 17 (27 Feb 2024 09:15) (11 - 19)  SpO2: 96% (27 Feb 2024 09:15) (92% - 100%)    Parameters below as of 27 Feb 2024 09:15  Patient On (Oxygen Delivery Method): room air      SUBJECTIVE: Patient seen and examined at bedside. Overnight patient tachycardic, EKG sinus tach patient asymptomatic otherwise VSS and with adequate UOP. Resolved this AM. Reports pain is controlled on PCA, delivered 2.8mg overnight. Has not had PO, denies nausea or vomiting, without return of bowel function. Denies chest pain or SOB.      General Appearance: Appears well, NAD  Neck: Supple  Chest: Equal expansion bilaterally, equal breath sounds  CV: Pulse regular presently  Abdomen: Softly distended, appropriately tender to midline incision without rebound or guarding. ODALIS x1 with sang > ss output. Steri strips superior portion with staining otherwise c/d/i  Extremities: Grossly symmetric, SCD's in place, no edema  Skin: Warm and dry     I&O's Summary    26 Feb 2024 07:01  -  27 Feb 2024 07:00  --------------------------------------------------------  IN: 1350 mL / OUT: 2260 mL / NET: -910 mL      I&O's Detail    26 Feb 2024 07:01  -  27 Feb 2024 07:00  --------------------------------------------------------  IN:    IV PiggyBack: 100 mL    Lactated Ringers: 1250 mL  Total IN: 1350 mL    OUT:    Bulb (mL): 60 mL    Indwelling Catheter - Urethral (mL): 2200 mL    Oral Fluid: 0 mL  Total OUT: 2260 mL    Total NET: -910 mL          MEDICATIONS  (STANDING):  acetaminophen   IVPB .. 1000 milliGRAM(s) IV Intermittent every 6 hours  heparin   Injectable 5000 Unit(s) SubCutaneous every 8 hours  HYDROmorphone PCA (1 mG/mL) 30 milliLiter(s) PCA Continuous PCA Continuous  influenza   Vaccine 0.5 milliLiter(s) IntraMuscular once  lactated ringers. 1000 milliLiter(s) (125 mL/Hr) IV Continuous <Continuous>    MEDICATIONS  (PRN):  naloxone Injectable 0.1 milliGRAM(s) IV Push every 3 minutes PRN For ANY of the following changes in patient status:  A. RR LESS THAN 10 breaths per minute, B. Oxygen saturation LESS THAN 90%, C. Sedation score of 6  ondansetron Injectable 4 milliGRAM(s) IV Push every 6 hours PRN Nausea      LABS:                        12.5   15.41 )-----------( 195      ( 27 Feb 2024 05:10 )             37.1     02-27    138  |  100  |  12  ----------------------------<  114<H>  4.1   |  25  |  0.99    Ca    9.1      27 Feb 2024 05:10  Phos  3.7     02-27  Mg     1.70     02-27        Urinalysis Basic - ( 27 Feb 2024 05:10 )    Color: x / Appearance: x / SG: x / pH: x  Gluc: 114 mg/dL / Ketone: x  / Bili: x / Urobili: x   Blood: x / Protein: x / Nitrite: x   Leuk Esterase: x / RBC: x / WBC x   Sq Epi: x / Non Sq Epi: x / Bacteria: x        RADIOLOGY & ADDITIONAL STUDIES:

## 2024-02-28 LAB
ANION GAP SERPL CALC-SCNC: 13 MMOL/L — SIGNIFICANT CHANGE UP (ref 7–14)
BASOPHILS # BLD AUTO: 0.05 K/UL — SIGNIFICANT CHANGE UP (ref 0–0.2)
BASOPHILS NFR BLD AUTO: 0.5 % — SIGNIFICANT CHANGE UP (ref 0–2)
BUN SERPL-MCNC: 13 MG/DL — SIGNIFICANT CHANGE UP (ref 7–23)
CALCIUM SERPL-MCNC: 9 MG/DL — SIGNIFICANT CHANGE UP (ref 8.4–10.5)
CHLORIDE SERPL-SCNC: 101 MMOL/L — SIGNIFICANT CHANGE UP (ref 98–107)
CO2 SERPL-SCNC: 26 MMOL/L — SIGNIFICANT CHANGE UP (ref 22–31)
CREAT SERPL-MCNC: 0.99 MG/DL — SIGNIFICANT CHANGE UP (ref 0.5–1.3)
EGFR: 88 ML/MIN/1.73M2 — SIGNIFICANT CHANGE UP
EOSINOPHIL # BLD AUTO: 0.14 K/UL — SIGNIFICANT CHANGE UP (ref 0–0.5)
EOSINOPHIL NFR BLD AUTO: 1.3 % — SIGNIFICANT CHANGE UP (ref 0–6)
GLUCOSE SERPL-MCNC: 86 MG/DL — SIGNIFICANT CHANGE UP (ref 70–99)
HCT VFR BLD CALC: 38.2 % — LOW (ref 39–50)
HGB BLD-MCNC: 12.6 G/DL — LOW (ref 13–17)
IANC: 7.05 K/UL — SIGNIFICANT CHANGE UP (ref 1.8–7.4)
IMM GRANULOCYTES NFR BLD AUTO: 0.4 % — SIGNIFICANT CHANGE UP (ref 0–0.9)
LYMPHOCYTES # BLD AUTO: 27.8 % — SIGNIFICANT CHANGE UP (ref 13–44)
LYMPHOCYTES # BLD AUTO: 3.08 K/UL — SIGNIFICANT CHANGE UP (ref 1–3.3)
MAGNESIUM SERPL-MCNC: 1.8 MG/DL — SIGNIFICANT CHANGE UP (ref 1.6–2.6)
MCHC RBC-ENTMCNC: 30.7 PG — SIGNIFICANT CHANGE UP (ref 27–34)
MCHC RBC-ENTMCNC: 33 GM/DL — SIGNIFICANT CHANGE UP (ref 32–36)
MCV RBC AUTO: 93.2 FL — SIGNIFICANT CHANGE UP (ref 80–100)
MONOCYTES # BLD AUTO: 0.72 K/UL — SIGNIFICANT CHANGE UP (ref 0–0.9)
MONOCYTES NFR BLD AUTO: 6.5 % — SIGNIFICANT CHANGE UP (ref 2–14)
NEUTROPHILS # BLD AUTO: 7.05 K/UL — SIGNIFICANT CHANGE UP (ref 1.8–7.4)
NEUTROPHILS NFR BLD AUTO: 63.5 % — SIGNIFICANT CHANGE UP (ref 43–77)
NRBC # BLD: 0 /100 WBCS — SIGNIFICANT CHANGE UP (ref 0–0)
NRBC # FLD: 0 K/UL — SIGNIFICANT CHANGE UP (ref 0–0)
PHOSPHATE SERPL-MCNC: 3 MG/DL — SIGNIFICANT CHANGE UP (ref 2.5–4.5)
PLATELET # BLD AUTO: 155 K/UL — SIGNIFICANT CHANGE UP (ref 150–400)
POTASSIUM SERPL-MCNC: 4 MMOL/L — SIGNIFICANT CHANGE UP (ref 3.5–5.3)
POTASSIUM SERPL-SCNC: 4 MMOL/L — SIGNIFICANT CHANGE UP (ref 3.5–5.3)
RBC # BLD: 4.1 M/UL — LOW (ref 4.2–5.8)
RBC # FLD: 12.7 % — SIGNIFICANT CHANGE UP (ref 10.3–14.5)
SODIUM SERPL-SCNC: 140 MMOL/L — SIGNIFICANT CHANGE UP (ref 135–145)
WBC # BLD: 11.08 K/UL — HIGH (ref 3.8–10.5)
WBC # FLD AUTO: 11.08 K/UL — HIGH (ref 3.8–10.5)

## 2024-02-28 RX ORDER — DEXTROSE MONOHYDRATE, SODIUM CHLORIDE, AND POTASSIUM CHLORIDE 50; .745; 4.5 G/1000ML; G/1000ML; G/1000ML
1000 INJECTION, SOLUTION INTRAVENOUS
Refills: 0 | Status: DISCONTINUED | OUTPATIENT
Start: 2024-02-28 | End: 2024-03-01

## 2024-02-28 RX ORDER — ACETAMINOPHEN 500 MG
1000 TABLET ORAL ONCE
Refills: 0 | Status: COMPLETED | OUTPATIENT
Start: 2024-02-28 | End: 2024-02-28

## 2024-02-28 RX ORDER — MAGNESIUM SULFATE 500 MG/ML
2 VIAL (ML) INJECTION ONCE
Refills: 0 | Status: COMPLETED | OUTPATIENT
Start: 2024-02-28 | End: 2024-02-28

## 2024-02-28 RX ADMIN — Medication 1000 MILLIGRAM(S): at 11:09

## 2024-02-28 RX ADMIN — HYDROMORPHONE HYDROCHLORIDE 30 MILLILITER(S): 2 INJECTION INTRAMUSCULAR; INTRAVENOUS; SUBCUTANEOUS at 20:12

## 2024-02-28 RX ADMIN — Medication 25 GRAM(S): at 09:38

## 2024-02-28 RX ADMIN — Medication 1000 MILLIGRAM(S): at 06:01

## 2024-02-28 RX ADMIN — HYDROMORPHONE HYDROCHLORIDE 30 MILLILITER(S): 2 INJECTION INTRAMUSCULAR; INTRAVENOUS; SUBCUTANEOUS at 08:29

## 2024-02-28 RX ADMIN — Medication 1000 MILLIGRAM(S): at 18:03

## 2024-02-28 RX ADMIN — Medication 10 MILLIGRAM(S): at 13:07

## 2024-02-28 RX ADMIN — Medication 10 MILLIGRAM(S): at 22:12

## 2024-02-28 RX ADMIN — HEPARIN SODIUM 5000 UNIT(S): 5000 INJECTION INTRAVENOUS; SUBCUTANEOUS at 22:12

## 2024-02-28 RX ADMIN — Medication 400 MILLIGRAM(S): at 17:18

## 2024-02-28 RX ADMIN — Medication 1000 MILLIGRAM(S): at 00:29

## 2024-02-28 RX ADMIN — Medication 10 MILLIGRAM(S): at 05:30

## 2024-02-28 RX ADMIN — Medication 1000 MILLIGRAM(S): at 05:31

## 2024-02-28 RX ADMIN — HEPARIN SODIUM 5000 UNIT(S): 5000 INJECTION INTRAVENOUS; SUBCUTANEOUS at 13:06

## 2024-02-28 RX ADMIN — HEPARIN SODIUM 5000 UNIT(S): 5000 INJECTION INTRAVENOUS; SUBCUTANEOUS at 05:31

## 2024-02-28 RX ADMIN — DEXTROSE MONOHYDRATE, SODIUM CHLORIDE, AND POTASSIUM CHLORIDE 75 MILLILITER(S): 50; .745; 4.5 INJECTION, SOLUTION INTRAVENOUS at 14:10

## 2024-02-28 RX ADMIN — Medication 1000 MILLIGRAM(S): at 12:00

## 2024-02-28 NOTE — PROGRESS NOTE ADULT - SUBJECTIVE AND OBJECTIVE BOX
Anesthesia Pain Management Service    SUBJECTIVE: Patient is doing well with IV PCA and no significant problems reported. Patient sleeping, PCA still helping, pain better from yesterday, still NPO.    Pain Scale Score	At rest: ___ 	With Activity: ___ 	[X ] Refer to charted pain scores    THERAPY:    [ ] IV PCA Morphine		[ ] 5 mg/mL	[ ] 1 mg/mL  [X ] IV PCA Hydromorphone	[ ] 5 mg/mL	[X ] 1 mg/mL  [ ] IV PCA Fentanyl		[ ] 50 micrograms/mL    Demand dose __0.2_ lockout __6_ (minutes) Continuous Rate _0__ Total: __7.8_  mg used (in past 24 hours)      MEDICATIONS  (STANDING):  acetaminophen     Tablet .. 1000 milliGRAM(s) Oral every 6 hours  heparin   Injectable 5000 Unit(s) SubCutaneous every 8 hours  HYDROmorphone PCA (1 mG/mL) 30 milliLiter(s) PCA Continuous PCA Continuous  influenza   Vaccine 0.5 milliLiter(s) IntraMuscular once  lactated ringers. 1000 milliLiter(s) (75 mL/Hr) IV Continuous <Continuous>  metoclopramide Injectable 10 milliGRAM(s) IV Push every 8 hours    MEDICATIONS  (PRN):  naloxone Injectable 0.1 milliGRAM(s) IV Push every 3 minutes PRN For ANY of the following changes in patient status:  A. RR LESS THAN 10 breaths per minute, B. Oxygen saturation LESS THAN 90%, C. Sedation score of 6  ondansetron Injectable 4 milliGRAM(s) IV Push every 6 hours PRN Nausea      OBJECTIVE:    Sedation Score:	[ X] Alert	[ ] Drowsy 	[ ] Arousable	[ ] Asleep	[ ] Unresponsive    Side Effects:	[X ] None	[ ] Nausea	[ ] Vomiting	[ ] Pruritus  		[ ] Other:    Vital Signs Last 24 Hrs  T(C): 36.9 (28 Feb 2024 10:04), Max: 37.4 (28 Feb 2024 05:42)  T(F): 98.5 (28 Feb 2024 10:04), Max: 99.4 (28 Feb 2024 05:42)  HR: 94 (28 Feb 2024 10:04) (80 - 94)  BP: 151/80 (28 Feb 2024 10:04) (113/64 - 151/80)  BP(mean): --  RR: 17 (28 Feb 2024 10:04) (16 - 18)  SpO2: 95% (28 Feb 2024 10:04) (91% - 96%)    Parameters below as of 28 Feb 2024 10:04  Patient On (Oxygen Delivery Method): room air        ASSESSMENT/ PLAN    Therapy to  be:	[ X] Continue   [ ] Discontinued   [ ] Change to prn Analgesics    Documentation and Verification of current medications:   [X] Done	[ ] Not done, not elligible    Comments: Recommend non-opioid adjuvant analgesics to be used when possible and when allowed by primary surgical team.    Progress Note written now but Patient was seen earlier.

## 2024-02-28 NOTE — PROGRESS NOTE ADULT - SUBJECTIVE AND OBJECTIVE BOX
Surgery Progress Note    -minor desaturation overnight, patient given 2L NC.     SUBJECTIVE: Pt seen and examined at bedside. Patient comfortable and in no-apparent distress. Pain is controlled.     Vital Signs Last 24 Hrs  T(C): 37.1 (27 Feb 2024 22:00), Max: 37.2 (27 Feb 2024 17:45)  T(F): 98.8 (27 Feb 2024 22:00), Max: 99 (27 Feb 2024 17:45)  HR: 83 (27 Feb 2024 22:00) (76 - 88)  BP: 150/73 (27 Feb 2024 22:00) (113/64 - 150/73)  BP(mean): --  RR: 18 (27 Feb 2024 22:00) (16 - 18)  SpO2: 93% (27 Feb 2024 22:00) (91% - 96%)    Parameters below as of 27 Feb 2024 22:00  Patient On (Oxygen Delivery Method): room air        Physical Exam:  General Appearance: Appears well, NAD  Respiratory: No labored breathing  CV: Pulse regularly present  Abdomen: Soft, nontender, incisions c/d/i    LABS:                        12.5   15.41 )-----------( 195      ( 27 Feb 2024 05:10 )             37.1     02-27    138  |  100  |  12  ----------------------------<  114<H>  4.1   |  25  |  0.99    Ca    9.1      27 Feb 2024 05:10  Phos  3.7     02-27  Mg     1.70     02-27        Urinalysis Basic - ( 27 Feb 2024 05:10 )    Color: x / Appearance: x / SG: x / pH: x  Gluc: 114 mg/dL / Ketone: x  / Bili: x / Urobili: x   Blood: x / Protein: x / Nitrite: x   Leuk Esterase: x / RBC: x / WBC x   Sq Epi: x / Non Sq Epi: x / Bacteria: x        INs and OUTs:    02-26-24 @ 07:01  -  02-27-24 @ 07:00  --------------------------------------------------------  IN: 1350 mL / OUT: 2260 mL / NET: -910 mL    02-27-24 @ 07:01  -  02-28-24 @ 05:34  --------------------------------------------------------  IN: 1550 mL / OUT: 1685 mL / NET: -135 mL     Surgery Progress Note    -minor desaturation overnight, patient given 2L NC.     SUBJECTIVE: Pt seen and examined at bedside. Patient comfortable and in no-apparent distress. Pain is controlled. Patient denies passing gas and denies having bowel movements. Patient denies getting out of bed.     Vital Signs Last 24 Hrs  T(C): 37.1 (27 Feb 2024 22:00), Max: 37.2 (27 Feb 2024 17:45)  T(F): 98.8 (27 Feb 2024 22:00), Max: 99 (27 Feb 2024 17:45)  HR: 83 (27 Feb 2024 22:00) (76 - 88)  BP: 150/73 (27 Feb 2024 22:00) (113/64 - 150/73)  BP(mean): --  RR: 18 (27 Feb 2024 22:00) (16 - 18)  SpO2: 93% (27 Feb 2024 22:00) (91% - 96%)    Parameters below as of 27 Feb 2024 22:00  Patient On (Oxygen Delivery Method): room air        Physical Exam:  General Appearance: NAD  Respiratory: No labored breathing, NC on 2 liters   CV: Pulse regularly present  Abdomen: Soft, nontender, incisions c/d/i, drain with scant SS fluid     LABS:                        12.5   15.41 )-----------( 195      ( 27 Feb 2024 05:10 )             37.1     02-27    138  |  100  |  12  ----------------------------<  114<H>  4.1   |  25  |  0.99    Ca    9.1      27 Feb 2024 05:10  Phos  3.7     02-27  Mg     1.70     02-27        Urinalysis Basic - ( 27 Feb 2024 05:10 )    Color: x / Appearance: x / SG: x / pH: x  Gluc: 114 mg/dL / Ketone: x  / Bili: x / Urobili: x   Blood: x / Protein: x / Nitrite: x   Leuk Esterase: x / RBC: x / WBC x   Sq Epi: x / Non Sq Epi: x / Bacteria: x        INs and OUTs:    02-26-24 @ 07:01  -  02-27-24 @ 07:00  --------------------------------------------------------  IN: 1350 mL / OUT: 2260 mL / NET: -910 mL    02-27-24 @ 07:01  -  02-28-24 @ 05:34  --------------------------------------------------------  IN: 1550 mL / OUT: 1685 mL / NET: -135 mL

## 2024-02-28 NOTE — PROGRESS NOTE ADULT - ASSESSMENT
58 year old male patient S/P lap-assisted partial colectomy for colon cancer. Pos-top course c/b sinus tach, improved and recovering appropriately.     Plan:  - Pain control: standing tylenol and PCA  - Diet: Sips and chips  - Continue IVF, will decrease rate when tolerating adequate PO   - ODALIS drain teaching   - Resume home meds  - Activity: OOBAT  - DVT ppx: SCD's & SQH      A Team Surgery  [id number]   58 year old male patient S/P lap-assisted partial colectomy for colon cancer. Pos-top course c/b sinus tach, improved and recovering appropriately.     Plan:  - Pain control: standing tylenol and PCA  - Diet: Sips and chips  - Continue IVF, will decrease rate when tolerating adequate PO   - ODALIS drain teaching   - Resume home meds  - Activity: Encourage OOBAT  - DVT ppx: SCD's & SQH      A Team Surgery  [id number]   58 year old male patient S/P lap-assisted partial colectomy for colon cancer. Pos-top course c/b sinus tach, improved and recovering appropriately.     Plan:  - Pain control: standing IV tylenol and PCA  - Diet: Sips and chips  - Continue IVF, will decrease rate when tolerating adequate PO   - ODALIS drain teaching   - Resume home meds  - Activity: Encourage OOBAT  - DVT ppx: SCD's & SQH      A Team Surgery  [id number]

## 2024-02-28 NOTE — PROGRESS NOTE ADULT - SUBJECTIVE AND OBJECTIVE BOX
Anesthesia Pain Management Service    SUBJECTIVE: Pt doing well with IV PCA without problems reported.    Therapy:	  [ X] IV PCA	   [ ] Epidural           [ ] s/p Spinal Opoid              [ ] Postpartum infusion	  [ ] Patient controlled regional anesthesia (PCRA)    [ ] prn Analgesics    Allergies    No Known Allergies    Intolerances      MEDICATIONS  (STANDING):  acetaminophen     Tablet .. 1000 milliGRAM(s) Oral every 6 hours  dextrose 5% + sodium chloride 0.45% with potassium chloride 20 mEq/L 1000 milliLiter(s) (75 mL/Hr) IV Continuous <Continuous>  heparin   Injectable 5000 Unit(s) SubCutaneous every 8 hours  HYDROmorphone PCA (1 mG/mL) 30 milliLiter(s) PCA Continuous PCA Continuous  influenza   Vaccine 0.5 milliLiter(s) IntraMuscular once  metoclopramide Injectable 10 milliGRAM(s) IV Push every 8 hours    MEDICATIONS  (PRN):  naloxone Injectable 0.1 milliGRAM(s) IV Push every 3 minutes PRN For ANY of the following changes in patient status:  A. RR LESS THAN 10 breaths per minute, B. Oxygen saturation LESS THAN 90%, C. Sedation score of 6  ondansetron Injectable 4 milliGRAM(s) IV Push every 6 hours PRN Nausea      OBJECTIVE:   [X] No new signs     [ ] Other:    Side Effects:  [X ] None			[ ] Other:    Assessment of Catheter Site:		[ ] Intact		[ ] Other:    ASSESSMENT/PLAN  [ X] Continue current therapy    [ ] Therapy changed to:    [ ] IV PCA       [ ] Epidural     [ ] prn Analgesics     Comments:

## 2024-02-29 LAB
ANION GAP SERPL CALC-SCNC: 12 MMOL/L — SIGNIFICANT CHANGE UP (ref 7–14)
APPEARANCE UR: CLEAR — SIGNIFICANT CHANGE UP
BILIRUB UR-MCNC: NEGATIVE — SIGNIFICANT CHANGE UP
BUN SERPL-MCNC: 10 MG/DL — SIGNIFICANT CHANGE UP (ref 7–23)
CALCIUM SERPL-MCNC: 8.8 MG/DL — SIGNIFICANT CHANGE UP (ref 8.4–10.5)
CHLORIDE SERPL-SCNC: 99 MMOL/L — SIGNIFICANT CHANGE UP (ref 98–107)
CO2 SERPL-SCNC: 24 MMOL/L — SIGNIFICANT CHANGE UP (ref 22–31)
COLOR SPEC: SIGNIFICANT CHANGE UP
CREAT SERPL-MCNC: 0.8 MG/DL — SIGNIFICANT CHANGE UP (ref 0.5–1.3)
DIFF PNL FLD: NEGATIVE — SIGNIFICANT CHANGE UP
EGFR: 102 ML/MIN/1.73M2 — SIGNIFICANT CHANGE UP
GLUCOSE SERPL-MCNC: 123 MG/DL — HIGH (ref 70–99)
GLUCOSE UR QL: NEGATIVE MG/DL — SIGNIFICANT CHANGE UP
HCT VFR BLD CALC: 39.2 % — SIGNIFICANT CHANGE UP (ref 39–50)
HGB BLD-MCNC: 13.2 G/DL — SIGNIFICANT CHANGE UP (ref 13–17)
KETONES UR-MCNC: >=160 MG/DL
LEUKOCYTE ESTERASE UR-ACNC: NEGATIVE — SIGNIFICANT CHANGE UP
MAGNESIUM SERPL-MCNC: 2 MG/DL — SIGNIFICANT CHANGE UP (ref 1.6–2.6)
MCHC RBC-ENTMCNC: 31 PG — SIGNIFICANT CHANGE UP (ref 27–34)
MCHC RBC-ENTMCNC: 33.7 GM/DL — SIGNIFICANT CHANGE UP (ref 32–36)
MCV RBC AUTO: 92 FL — SIGNIFICANT CHANGE UP (ref 80–100)
NITRITE UR-MCNC: NEGATIVE — SIGNIFICANT CHANGE UP
NRBC # BLD: 0 /100 WBCS — SIGNIFICANT CHANGE UP (ref 0–0)
NRBC # FLD: 0 K/UL — SIGNIFICANT CHANGE UP (ref 0–0)
PH UR: 6 — SIGNIFICANT CHANGE UP (ref 5–8)
PHOSPHATE SERPL-MCNC: 2.5 MG/DL — SIGNIFICANT CHANGE UP (ref 2.5–4.5)
PLATELET # BLD AUTO: 172 K/UL — SIGNIFICANT CHANGE UP (ref 150–400)
POTASSIUM SERPL-MCNC: 3.3 MMOL/L — LOW (ref 3.5–5.3)
POTASSIUM SERPL-SCNC: 3.3 MMOL/L — LOW (ref 3.5–5.3)
PROT UR-MCNC: SIGNIFICANT CHANGE UP MG/DL
RBC # BLD: 4.26 M/UL — SIGNIFICANT CHANGE UP (ref 4.2–5.8)
RBC # FLD: 12.5 % — SIGNIFICANT CHANGE UP (ref 10.3–14.5)
SODIUM SERPL-SCNC: 135 MMOL/L — SIGNIFICANT CHANGE UP (ref 135–145)
SP GR SPEC: 1.02 — SIGNIFICANT CHANGE UP (ref 1–1.03)
UROBILINOGEN FLD QL: 0.2 MG/DL — SIGNIFICANT CHANGE UP (ref 0.2–1)
WBC # BLD: 9.47 K/UL — SIGNIFICANT CHANGE UP (ref 3.8–10.5)
WBC # FLD AUTO: 9.47 K/UL — SIGNIFICANT CHANGE UP (ref 3.8–10.5)

## 2024-02-29 PROCEDURE — 71045 X-RAY EXAM CHEST 1 VIEW: CPT | Mod: 26

## 2024-02-29 RX ORDER — ACETAMINOPHEN 500 MG
1000 TABLET ORAL EVERY 6 HOURS
Refills: 0 | Status: COMPLETED | OUTPATIENT
Start: 2024-02-29 | End: 2024-02-29

## 2024-02-29 RX ORDER — POTASSIUM CHLORIDE 20 MEQ
10 PACKET (EA) ORAL
Refills: 0 | Status: DISCONTINUED | OUTPATIENT
Start: 2024-02-29 | End: 2024-02-29

## 2024-02-29 RX ORDER — HYDROMORPHONE HYDROCHLORIDE 2 MG/ML
0.5 INJECTION INTRAMUSCULAR; INTRAVENOUS; SUBCUTANEOUS EVERY 4 HOURS
Refills: 0 | Status: DISCONTINUED | OUTPATIENT
Start: 2024-02-29 | End: 2024-03-01

## 2024-02-29 RX ORDER — POTASSIUM CHLORIDE 20 MEQ
20 PACKET (EA) ORAL ONCE
Refills: 0 | Status: COMPLETED | OUTPATIENT
Start: 2024-02-29 | End: 2024-02-29

## 2024-02-29 RX ORDER — HYDROMORPHONE HYDROCHLORIDE 2 MG/ML
1 INJECTION INTRAMUSCULAR; INTRAVENOUS; SUBCUTANEOUS EVERY 4 HOURS
Refills: 0 | Status: DISCONTINUED | OUTPATIENT
Start: 2024-02-29 | End: 2024-03-01

## 2024-02-29 RX ADMIN — HYDROMORPHONE HYDROCHLORIDE 1 MILLIGRAM(S): 2 INJECTION INTRAMUSCULAR; INTRAVENOUS; SUBCUTANEOUS at 22:32

## 2024-02-29 RX ADMIN — HYDROMORPHONE HYDROCHLORIDE 1 MILLIGRAM(S): 2 INJECTION INTRAMUSCULAR; INTRAVENOUS; SUBCUTANEOUS at 17:00

## 2024-02-29 RX ADMIN — Medication 100 MILLIEQUIVALENT(S): at 07:25

## 2024-02-29 RX ADMIN — Medication 400 MILLIGRAM(S): at 14:09

## 2024-02-29 RX ADMIN — HEPARIN SODIUM 5000 UNIT(S): 5000 INJECTION INTRAVENOUS; SUBCUTANEOUS at 05:34

## 2024-02-29 RX ADMIN — Medication 20 MILLIEQUIVALENT(S): at 19:13

## 2024-02-29 RX ADMIN — Medication 400 MILLIGRAM(S): at 01:51

## 2024-02-29 RX ADMIN — HEPARIN SODIUM 5000 UNIT(S): 5000 INJECTION INTRAVENOUS; SUBCUTANEOUS at 22:03

## 2024-02-29 RX ADMIN — HYDROMORPHONE HYDROCHLORIDE 1 MILLIGRAM(S): 2 INJECTION INTRAMUSCULAR; INTRAVENOUS; SUBCUTANEOUS at 12:58

## 2024-02-29 RX ADMIN — Medication 10 MILLIGRAM(S): at 14:09

## 2024-02-29 RX ADMIN — Medication 10 MILLIGRAM(S): at 22:02

## 2024-02-29 RX ADMIN — Medication 10 MILLIGRAM(S): at 05:34

## 2024-02-29 RX ADMIN — Medication 1000 MILLIGRAM(S): at 09:19

## 2024-02-29 RX ADMIN — Medication 1000 MILLIGRAM(S): at 02:21

## 2024-02-29 RX ADMIN — Medication 1000 MILLIGRAM(S): at 19:43

## 2024-02-29 RX ADMIN — Medication 1000 MILLIGRAM(S): at 14:30

## 2024-02-29 RX ADMIN — Medication 400 MILLIGRAM(S): at 08:49

## 2024-02-29 RX ADMIN — HYDROMORPHONE HYDROCHLORIDE 30 MILLILITER(S): 2 INJECTION INTRAMUSCULAR; INTRAVENOUS; SUBCUTANEOUS at 05:36

## 2024-02-29 RX ADMIN — HYDROMORPHONE HYDROCHLORIDE 1 MILLIGRAM(S): 2 INJECTION INTRAMUSCULAR; INTRAVENOUS; SUBCUTANEOUS at 12:28

## 2024-02-29 RX ADMIN — HYDROMORPHONE HYDROCHLORIDE 1 MILLIGRAM(S): 2 INJECTION INTRAMUSCULAR; INTRAVENOUS; SUBCUTANEOUS at 17:15

## 2024-02-29 RX ADMIN — Medication 400 MILLIGRAM(S): at 19:13

## 2024-02-29 RX ADMIN — HEPARIN SODIUM 5000 UNIT(S): 5000 INJECTION INTRAVENOUS; SUBCUTANEOUS at 14:09

## 2024-02-29 RX ADMIN — HYDROMORPHONE HYDROCHLORIDE 1 MILLIGRAM(S): 2 INJECTION INTRAMUSCULAR; INTRAVENOUS; SUBCUTANEOUS at 23:02

## 2024-02-29 NOTE — PROGRESS NOTE ADULT - SUBJECTIVE AND OBJECTIVE BOX
Anesthesia Pain Management Service    SUBJECTIVE: Pt now off IV PCA without problems reported.  Pain Score: 7/10    THERAPY:    [ ] IV PCA Morphine		[ ] 5 mg/mL	[ ] 1 mg/mL  [X ] IV PCA Hydromorphone	[ ] 5 mg/mL	[X ] 1 mg/mL  [ ] IV PCA Fentanyl		[ ] 50 micrograms/mL    Demand dose __0.2_ lockout __6_ (minutes) Continuous Rate _0__ Total: _5.8__  mg used (in past 24 hours)    Therapy:	  [ X] IV PCA	   [ ] Epidural           [ ] s/p Spinal Opoid              [ ] Postpartum infusion	  [ ] Patient controlled regional anesthesia (PCRA)    [ ] prn Analgesics    Allergies  No Known Allergies      MEDICATIONS  (STANDING):  acetaminophen   IVPB .. 1000 milliGRAM(s) IV Intermittent every 6 hours  dextrose 5% + sodium chloride 0.45% with potassium chloride 20 mEq/L 1000 milliLiter(s) (75 mL/Hr) IV Continuous <Continuous>  heparin   Injectable 5000 Unit(s) SubCutaneous every 8 hours  influenza   Vaccine 0.5 milliLiter(s) IntraMuscular once  metoclopramide Injectable 10 milliGRAM(s) IV Push every 8 hours  potassium chloride    Tablet ER 20 milliEquivalent(s) Oral once    MEDICATIONS  (PRN):  HYDROmorphone  Injectable 1 milliGRAM(s) IV Push every 4 hours PRN Severe Pain (7 - 10)  HYDROmorphone  Injectable 0.5 milliGRAM(s) IV Push every 4 hours PRN Moderate Pain (4 - 6)  naloxone Injectable 0.1 milliGRAM(s) IV Push every 3 minutes PRN For ANY of the following changes in patient status:  A. RR LESS THAN 10 breaths per minute, B. Oxygen saturation LESS THAN 90%, C. Sedation score of 6  ondansetron Injectable 4 milliGRAM(s) IV Push every 6 hours PRN Nausea      OBJECTIVE:   [X] No new signs     [ ] Other:    Side Effects:  [X ] None			[ ] Other:    Assessment of Catheter Site:		[ ] Intact		[ ] Other:    ASSESSMENT/PLAN  [ ] Continue current therapy    [X ] Therapy changed to:    [ ] IV PCA       [ ] Epidural     [ X] prn Analgesics     Comments: IV PCA discontinued by team and they ordered PRN Oral/IV opioids and/or non-opioid adjuvant analgesics to be used at this point.  Instructed patient to ask RN for pain medications as needed.    Progress Note written now but Patient was seen earlier.

## 2024-02-29 NOTE — PROGRESS NOTE ADULT - SUBJECTIVE AND OBJECTIVE BOX
Anesthesia Pain Management Service- Attending Addendum    SUBJECTIVE: Patient's pain control adequate    Therapy:	  [ X] IV PCA	   [ ] Epidural           [ ] s/p Spinal Opoid              [ ] Postpartum infusion	  [ ] Patient controlled regional anesthesia (PCRA)    [ ] prn Analgesics    Allergies    No Known Allergies    Intolerances      MEDICATIONS  (STANDING):  acetaminophen   IVPB .. 1000 milliGRAM(s) IV Intermittent every 6 hours  dextrose 5% + sodium chloride 0.45% with potassium chloride 20 mEq/L 1000 milliLiter(s) (75 mL/Hr) IV Continuous <Continuous>  heparin   Injectable 5000 Unit(s) SubCutaneous every 8 hours  influenza   Vaccine 0.5 milliLiter(s) IntraMuscular once  metoclopramide Injectable 10 milliGRAM(s) IV Push every 8 hours  potassium chloride    Tablet ER 20 milliEquivalent(s) Oral once    MEDICATIONS  (PRN):  HYDROmorphone  Injectable 0.5 milliGRAM(s) IV Push every 4 hours PRN Moderate Pain (4 - 6)  HYDROmorphone  Injectable 1 milliGRAM(s) IV Push every 4 hours PRN Severe Pain (7 - 10)  naloxone Injectable 0.1 milliGRAM(s) IV Push every 3 minutes PRN For ANY of the following changes in patient status:  A. RR LESS THAN 10 breaths per minute, B. Oxygen saturation LESS THAN 90%, C. Sedation score of 6  ondansetron Injectable 4 milliGRAM(s) IV Push every 6 hours PRN Nausea      OBJECTIVE:   [X] No new signs     [ ] Other:    Side Effects:  [X ] None			[ ] Other:      ASSESSMENT/PLAN  -Discontinue current therapy    [ ] Therapy changed to:    [ ] IV PCA       [ ] Epidural     [ X] prn Analgesics     Comments: Pain management per primary team, APS to sign off

## 2024-02-29 NOTE — PROGRESS NOTE ADULT - SUBJECTIVE AND OBJECTIVE BOX
Surgery Progress Note    24 hours events: Patient spiked a fever to 100.8 F.     SUBJECTIVE: Pt seen and examined at bedside. Patient comfortable and in no-apparent distress. Patient denies ambulating or getting out of bed. Patient denies passing gas or having bowel movements. No N/v.       Vital Signs Last 24 Hrs  T(C): 37.2 (2024 02:21), Max: 38.2 (2024 01:20)  T(F): 99 (2024 02:21), Max: 100.8 (2024 01:20)  HR: 99 (2024 01:20) (90 - 99)  BP: 155/61 (2024 01:20) (143/78 - 157/87)  BP(mean): --  RR: 18 (2024 01:20) (16 - 18)  SpO2: 99% (:20) (95% - 99%)    Parameters below as of 2024 01:20  Patient On (Oxygen Delivery Method): room air        Physical Exam:  General Appearance: NAD  Respiratory: No labored breathing  CV: Pulse regularly present  Abdomen: Soft, nontender, midline incision c/d/i    LABS:                        13.2   9.47  )-----------( 172      ( 2024 02:00 )             39.2         135  |  99  |  10  ----------------------------<  123<H>  3.3<L>   |  24  |  0.80    Ca    8.8      2024 02:00  Phos  2.5       Mg     2.00             Urinalysis Basic - ( 2024 02:49 )    Color: Dark Yellow / Appearance: Clear / S.021 / pH: x  Gluc: x / Ketone: >=160 mg/dL  / Bili: Negative / Urobili: 0.2 mg/dL   Blood: x / Protein: Trace mg/dL / Nitrite: Negative   Leuk Esterase: Negative / RBC: x / WBC x   Sq Epi: x / Non Sq Epi: x / Bacteria: x        INs and OUTs:    24 @ 07:24 @ 07:00  --------------------------------------------------------  IN: 1970 mL / OUT: 1940 mL / NET: 30 mL    24 @ 07:24 @ 05:29  --------------------------------------------------------  IN: 1150 mL / OUT: 945 mL / NET: 205 mL     Surgery Progress Note    24 hours events: Patient spiked a fever to 100.8 F. UA negative CXR appears to reveal atelectasis     SUBJECTIVE: Pt seen and examined at bedside. Patient comfortable and in no-apparent distress. Patient states he ambulated twice yesterday. Patient denies passing gas or having bowel movements. No N/v.       Vital Signs Last 24 Hrs  T(C): 37.2 (2024 02:21), Max: 38.2 (2024 01:20)  T(F): 99 (2024 02:21), Max: 100.8 (2024 01:20)  HR: 99 (2024 01:20) (90 - 99)  BP: 155/61 (2024 01:20) (143/78 - 157/87)  RR: 18 (2024 01:20) (16 - 18)  SpO2: 99% (:20) (95% - 99%)  Parameters below as of 2024 01:20  Patient On (Oxygen Delivery Method): room air    Physical Exam:  General Appearance: NAD  Respiratory: No labored breathing  Abdomen: Softly distended, nontender, midline incision c/d/i,     LABS:                        13.2   9.47  )-----------( 172      ( 2024 02:00 )             39.2         135  |  99  |  10  ----------------------------<  123<H>  3.3<L>   |  24  |  0.80    Ca    8.8      2024 02:00  Phos  2.5       Mg     2.00             Urinalysis Basic - ( 2024 02:49 )    Color: Dark Yellow / Appearance: Clear / S.021 / pH: x  Gluc: x / Ketone: >=160 mg/dL  / Bili: Negative / Urobili: 0.2 mg/dL   Blood: x / Protein: Trace mg/dL / Nitrite: Negative   Leuk Esterase: Negative / RBC: x / WBC x   Sq Epi: x / Non Sq Epi: x / Bacteria: x        INs and OUTs:    24 @ 07:24 @ 07:00  --------------------------------------------------------  IN: 1970 mL / OUT: 1940 mL / NET: 30 mL    24 @ 07:24 @ 05:29  --------------------------------------------------------  IN: 1150 mL / OUT: 945 mL / NET: 205 mL

## 2024-02-29 NOTE — PROGRESS NOTE ADULT - ASSESSMENT
58 year old male patient S/P lap-assisted partial colectomy for colon cancer. Overnight patient spiked a low grade fever. Fever work up sent. Differential diagnosis includes atelectasis vs developing pneumonia    Plan:  - follow up fever work up  - Diet: Sips and chips  - Pain control: standing IV tylenol and PCA  - Continue IVF, will decrease rate when tolerating adequate PO   - ODALIS drain teaching   - Resume home meds  - Activity: Encourage OOBAT  - DVT ppx: SCD's & SQH      A Team Surgery  [id number]   58 year old male patient S/P lap-assisted partial colectomy for colon cancer. Overnight patient spiked a low grade fever. Fever work up sent. Differential diagnosis includes atelectasis vs developing pneumonia    Plan:  - follow up fever work up  - Diet: start clear diet  - Pain control: dc pca, continue standing tylenol and start iv dilaudid prn 5/10 mod/severe pain  - Continue IVF, will decrease rate when tolerating adequate PO   - ODALIS drain teaching   - Resume home meds  - Activity: Encourage OOBAT  - DVT ppx: SCD's & SQH      A Team Surgery  [id number]

## 2024-02-29 NOTE — PHYSICAL THERAPY INITIAL EVALUATION ADULT - ADDITIONAL COMMENTS
Pt states he lives with family in a house, 1 step to enter, resides on the first floor. Prior to admission pt reports being independent in ADLs and ambulation without device.     Following evaluation, pt was left sitting in chair in no distress, all lines in tact, call bell in reach.

## 2024-02-29 NOTE — PHYSICAL THERAPY INITIAL EVALUATION ADULT - PATIENT PROFILE REVIEW, REHAB EVAL
Activity - ambulate with assistance. Pt cleared for PT evaluation prior to session by VILMA Merritt./yes

## 2024-02-29 NOTE — PHYSICAL THERAPY INITIAL EVALUATION ADULT - PERTINENT HX OF CURRENT PROBLEM, REHAB EVAL
58 year old male post laparoscopic assisted partial colectomy. Sigmoid colon mobilization followed by laparotomy. POD#3.

## 2024-03-01 ENCOUNTER — TRANSCRIPTION ENCOUNTER (OUTPATIENT)
Age: 59
End: 2024-03-01

## 2024-03-01 LAB
ANION GAP SERPL CALC-SCNC: 12 MMOL/L — SIGNIFICANT CHANGE UP (ref 7–14)
BUN SERPL-MCNC: 9 MG/DL — SIGNIFICANT CHANGE UP (ref 7–23)
CALCIUM SERPL-MCNC: 9 MG/DL — SIGNIFICANT CHANGE UP (ref 8.4–10.5)
CHLORIDE SERPL-SCNC: 101 MMOL/L — SIGNIFICANT CHANGE UP (ref 98–107)
CO2 SERPL-SCNC: 25 MMOL/L — SIGNIFICANT CHANGE UP (ref 22–31)
CREAT SERPL-MCNC: 0.83 MG/DL — SIGNIFICANT CHANGE UP (ref 0.5–1.3)
EGFR: 101 ML/MIN/1.73M2 — SIGNIFICANT CHANGE UP
GLUCOSE SERPL-MCNC: 130 MG/DL — HIGH (ref 70–99)
HCT VFR BLD CALC: 40.9 % — SIGNIFICANT CHANGE UP (ref 39–50)
HGB BLD-MCNC: 13.6 G/DL — SIGNIFICANT CHANGE UP (ref 13–17)
MAGNESIUM SERPL-MCNC: 1.9 MG/DL — SIGNIFICANT CHANGE UP (ref 1.6–2.6)
MCHC RBC-ENTMCNC: 30.6 PG — SIGNIFICANT CHANGE UP (ref 27–34)
MCHC RBC-ENTMCNC: 33.3 GM/DL — SIGNIFICANT CHANGE UP (ref 32–36)
MCV RBC AUTO: 91.9 FL — SIGNIFICANT CHANGE UP (ref 80–100)
NRBC # BLD: 0 /100 WBCS — SIGNIFICANT CHANGE UP (ref 0–0)
NRBC # FLD: 0 K/UL — SIGNIFICANT CHANGE UP (ref 0–0)
PHOSPHATE SERPL-MCNC: 2.6 MG/DL — SIGNIFICANT CHANGE UP (ref 2.5–4.5)
PLATELET # BLD AUTO: 196 K/UL — SIGNIFICANT CHANGE UP (ref 150–400)
POTASSIUM SERPL-MCNC: 3.3 MMOL/L — LOW (ref 3.5–5.3)
POTASSIUM SERPL-SCNC: 3.3 MMOL/L — LOW (ref 3.5–5.3)
RBC # BLD: 4.45 M/UL — SIGNIFICANT CHANGE UP (ref 4.2–5.8)
RBC # FLD: 12.2 % — SIGNIFICANT CHANGE UP (ref 10.3–14.5)
SODIUM SERPL-SCNC: 138 MMOL/L — SIGNIFICANT CHANGE UP (ref 135–145)
WBC # BLD: 6.41 K/UL — SIGNIFICANT CHANGE UP (ref 3.8–10.5)
WBC # FLD AUTO: 6.41 K/UL — SIGNIFICANT CHANGE UP (ref 3.8–10.5)

## 2024-03-01 RX ORDER — OXYCODONE HYDROCHLORIDE 5 MG/1
2.5 TABLET ORAL EVERY 4 HOURS
Refills: 0 | Status: DISCONTINUED | OUTPATIENT
Start: 2024-03-01 | End: 2024-03-03

## 2024-03-01 RX ORDER — ACETAMINOPHEN 500 MG
1000 TABLET ORAL EVERY 6 HOURS
Refills: 0 | Status: DISCONTINUED | OUTPATIENT
Start: 2024-03-01 | End: 2024-03-01

## 2024-03-01 RX ORDER — ACETAMINOPHEN 500 MG
975 TABLET ORAL EVERY 6 HOURS
Refills: 0 | Status: DISCONTINUED | OUTPATIENT
Start: 2024-03-01 | End: 2024-03-03

## 2024-03-01 RX ORDER — SODIUM,POTASSIUM PHOSPHATES 278-250MG
2 POWDER IN PACKET (EA) ORAL ONCE
Refills: 0 | Status: COMPLETED | OUTPATIENT
Start: 2024-03-01 | End: 2024-03-01

## 2024-03-01 RX ORDER — MAGNESIUM SULFATE 500 MG/ML
1 VIAL (ML) INJECTION ONCE
Refills: 0 | Status: COMPLETED | OUTPATIENT
Start: 2024-03-01 | End: 2024-03-01

## 2024-03-01 RX ORDER — OXYCODONE HYDROCHLORIDE 5 MG/1
5 TABLET ORAL EVERY 4 HOURS
Refills: 0 | Status: DISCONTINUED | OUTPATIENT
Start: 2024-03-01 | End: 2024-03-03

## 2024-03-01 RX ORDER — AMLODIPINE BESYLATE 2.5 MG/1
5 TABLET ORAL DAILY
Refills: 0 | Status: DISCONTINUED | OUTPATIENT
Start: 2024-03-01 | End: 2024-03-03

## 2024-03-01 RX ORDER — POTASSIUM CHLORIDE 20 MEQ
30 PACKET (EA) ORAL ONCE
Refills: 0 | Status: COMPLETED | OUTPATIENT
Start: 2024-03-01 | End: 2024-03-01

## 2024-03-01 RX ADMIN — HYDROMORPHONE HYDROCHLORIDE 1 MILLIGRAM(S): 2 INJECTION INTRAMUSCULAR; INTRAVENOUS; SUBCUTANEOUS at 05:27

## 2024-03-01 RX ADMIN — Medication 30 MILLIEQUIVALENT(S): at 07:45

## 2024-03-01 RX ADMIN — Medication 100 GRAM(S): at 07:46

## 2024-03-01 RX ADMIN — Medication 400 MILLIGRAM(S): at 12:11

## 2024-03-01 RX ADMIN — OXYCODONE HYDROCHLORIDE 5 MILLIGRAM(S): 5 TABLET ORAL at 22:17

## 2024-03-01 RX ADMIN — Medication 400 MILLIGRAM(S): at 07:45

## 2024-03-01 RX ADMIN — HYDROMORPHONE HYDROCHLORIDE 1 MILLIGRAM(S): 2 INJECTION INTRAMUSCULAR; INTRAVENOUS; SUBCUTANEOUS at 04:57

## 2024-03-01 RX ADMIN — Medication 975 MILLIGRAM(S): at 18:29

## 2024-03-01 RX ADMIN — Medication 1000 MILLIGRAM(S): at 12:41

## 2024-03-01 RX ADMIN — HEPARIN SODIUM 5000 UNIT(S): 5000 INJECTION INTRAVENOUS; SUBCUTANEOUS at 05:58

## 2024-03-01 RX ADMIN — Medication 975 MILLIGRAM(S): at 17:59

## 2024-03-01 RX ADMIN — AMLODIPINE BESYLATE 5 MILLIGRAM(S): 2.5 TABLET ORAL at 20:07

## 2024-03-01 RX ADMIN — Medication 2 PACKET(S): at 07:45

## 2024-03-01 RX ADMIN — OXYCODONE HYDROCHLORIDE 5 MILLIGRAM(S): 5 TABLET ORAL at 21:47

## 2024-03-01 NOTE — DISCHARGE NOTE PROVIDER - HOSPITAL COURSE
58 y.o. male with HTN, reports h/o bright red blood per rectum in 01/08/2024, s/p ER visit at [facility], s/p labs and colonoscopy, polypectomy, positive for malignancy. Patient presented on 2/26 and underwent laparoscopic partial colectomy.   2/27: Patient with tachycardia that resolved overnight, pain well controlled on PCA, dumont removed  2/28: Continued on Sips and Chips, IVF, OOB, Continues on PCA  2/29: Low grade temperature overnight, UA clean, CXR reveals mild atelectasis, Clear liquid diet, PT consult   3/1: Passing gas, having bowel movements, diet advanced to low residue 58 y.o. male with HTN, presents to [facility] for evaluation scheduled for laparoscopic partial colectomy, reports h/o bright red blood per rectum in 01/08/2024, s/p ER visit at [facility], s/p labs and colonoscopy, polypectomy, positive for malignancy, c/o intermittent abdominal discomfort, bloating, denies further episodes of BRBPR, N/V, changes in bowel habits, unintentional weight loss, fatigue. Patient presented on 2/26 and underwent laparoscopic partial colectomy.       2/27: Patient with tachycardia that resolved overnight, pain well controlled on PCA, dumont removed  2/28: Continued on Sips and Chips, IVF, OOB, Continues on PCA  2/29: Low grade temperature overnight, UA clean, CXR reveals mild atelectasis, Clear liquid diet, PT consult   3/1: Passing gas, having bowel movements, diet advanced to low residue   3/2: Passing gas, having bowel movements, diet advanced to low residue   3/3: Passing gas, having bowel movements, diet advanced to low residue. ODALIS drain was removed. At this time, the patient is ready for discharge. The patient was hemodynamically stable, was tolerating PO diet, was voiding urine and passing stool, was ambulating, and was comfortable with adequate pain control. The patient was instructed to follow up with Dr. Zhao  within 1-2 weeks after discharge from the hospital. The patient/family felt comfortable with discharge. The patient was discharged to home/rehab. The patient had no other issues.

## 2024-03-01 NOTE — DISCHARGE NOTE PROVIDER - NSDCFUADDINST_GEN_ALL_CORE_FT
WOUND CARE:  Please keep incisions clean and dry. Please do not Scrub or rub incisions. Do not use lotion or powder on incisions.   BATHING: Please do not submerge wound underwater. You may shower and/or sponge bathe.  ACTIVITY: No heavy lifting or straining. Otherwise, you may return to your usual level of physical activity. If you are taking narcotic pain medication (such as Percocet) DO NOT drive a car, operate machinery or make important decisions.  DIET: Return to your usual diet.  NOTIFY YOUR SURGEON IF: You have any bleeding that does not stop, any pus draining from your wound(s), any fever (over 100.4 F) or chills, persistent nausea/vomiting, persistent diarrhea, or if your pain is not controlled on your discharge pain medications.  FOLLOW-UP: Please follow up with your primary care physician in one week regarding your hospitalization. Please follow-up with your surgeon, within 7 days following discharge- please call to schedule an appointment.

## 2024-03-01 NOTE — PROVIDER CONTACT NOTE (MEDICATION) - ACTION/TREATMENT ORDERED:
provider made aware. patient educated and verbalized understanding of education provided on heparin. pt still refusing. no further orders or interventions at this time.
no

## 2024-03-01 NOTE — PROGRESS NOTE ADULT - SUBJECTIVE AND OBJECTIVE BOX
A Team Surgery Daily Progress Note    Subjective:   Patient seen at bedside this AM. Reports feeling well, without complaints. Mild abdominal pain responsive to PO regimen. Tolerating diet without N/V. +Flatus/+BM. Ambulating. No other concerns reported.     24h Events:   - Overnight, no acute events    Objective:  Vital Signs  T(C): 36.9 (03-01 @ 02:00), Max: 37.2 (02-29 @ 13:46)  HR: 84 (03-01 @ 02:00) (80 - 95)  BP: 132/87 (03-01 @ 02:00) (132/87 - 177/95)  RR: 18 (03-01 @ 02:00) (17 - 18)  SpO2: 99% (03-01 @ 02:00) (97% - 99%)  02-28-24 @ 07:01  -  02-29-24 @ 07:00  --------------------------------------------------------  IN:  Total IN: 0 mL    OUT:    Bulb (mL): 55 mL    Voided (mL): 1200 mL  Total OUT: 1255 mL    Total NET: -1255 mL      02-29-24 @ 07:01  -  03-01-24 @ 05:47  --------------------------------------------------------  IN:  Total IN: 0 mL    OUT:    Bulb (mL): 150 mL    Voided (mL): 1200 mL  Total OUT: 1350 mL    Total NET: -1350 mL          Physical Exam:  GEN: resting in bed comfortably in NAD  RESP: no increased WOB  ABD: Softly distended, nontender, midline incision c/d/i,   EXTR: warm, well-perfused without gross deformities; spontaneous movement in b/l U/L extrem  NEURO: AAOx4    Labs:                        13.2   9.47  )-----------( 172      ( 29 Feb 2024 02:00 )             39.2   02-29    135  |  99  |  10  ----------------------------<  123<H>  3.3<L>   |  24  |  0.80    Ca    8.8      29 Feb 2024 02:00  Phos  2.5     02-29  Mg     2.00     02-29      CAPILLARY BLOOD GLUCOSE          Medications:   MEDICATIONS  (STANDING):  acetaminophen   IVPB .. 1000 milliGRAM(s) IV Intermittent every 6 hours  heparin   Injectable 5000 Unit(s) SubCutaneous every 8 hours  influenza   Vaccine 0.5 milliLiter(s) IntraMuscular once    MEDICATIONS  (PRN):  HYDROmorphone  Injectable 1 milliGRAM(s) IV Push every 4 hours PRN Severe Pain (7 - 10)  HYDROmorphone  Injectable 0.5 milliGRAM(s) IV Push every 4 hours PRN Moderate Pain (4 - 6)  naloxone Injectable 0.1 milliGRAM(s) IV Push every 3 minutes PRN For ANY of the following changes in patient status:  A. RR LESS THAN 10 breaths per minute, B. Oxygen saturation LESS THAN 90%, C. Sedation score of 6  ondansetron Injectable 4 milliGRAM(s) IV Push every 6 hours PRN Nausea      Imaging:       A Team Surgery Daily Progress Note    Subjective:   Patient seen at bedside this AM. Reports feeling well, without complaints. Mild abdominal pain responsive to IV regimen. Tolerating diet without N/V. +Flatus/+BM. Ambulating. No other concerns reported.     24h Events:   - Overnight, no acute events    Objective:  Vital Signs  T(C): 36.9 (03-01 @ 02:00), Max: 37.2 (02-29 @ 13:46)  HR: 84 (03-01 @ 02:00) (80 - 95)  BP: 132/87 (03-01 @ 02:00) (132/87 - 177/95)  RR: 18 (03-01 @ 02:00) (17 - 18)  SpO2: 99% (03-01 @ 02:00) (97% - 99%)  02-28-24 @ 07:01  -  02-29-24 @ 07:00  --------------------------------------------------------  IN:  Total IN: 0 mL    OUT:    Bulb (mL): 55 mL    Voided (mL): 1200 mL  Total OUT: 1255 mL    Total NET: -1255 mL      02-29-24 @ 07:01  -  03-01-24 @ 05:47  --------------------------------------------------------  IN:  Total IN: 0 mL    OUT:    Bulb (mL): 150 mL    Voided (mL): 1200 mL  Total OUT: 1350 mL    Total NET: -1350 mL          Physical Exam:  GEN: resting in bed comfortably in NAD  RESP: no increased WOB  ABD: Softly distended, nontender, midline incision c/d/i,   EXTR: warm, well-perfused without gross deformities; spontaneous movement in b/l U/L extrem  NEURO: AAOx4    Labs:                        13.2   9.47  )-----------( 172      ( 29 Feb 2024 02:00 )             39.2   02-29    135  |  99  |  10  ----------------------------<  123<H>  3.3<L>   |  24  |  0.80    Ca    8.8      29 Feb 2024 02:00  Phos  2.5     02-29  Mg     2.00     02-29      CAPILLARY BLOOD GLUCOSE          Medications:   MEDICATIONS  (STANDING):  acetaminophen   IVPB .. 1000 milliGRAM(s) IV Intermittent every 6 hours  heparin   Injectable 5000 Unit(s) SubCutaneous every 8 hours  influenza   Vaccine 0.5 milliLiter(s) IntraMuscular once    MEDICATIONS  (PRN):  HYDROmorphone  Injectable 1 milliGRAM(s) IV Push every 4 hours PRN Severe Pain (7 - 10)  HYDROmorphone  Injectable 0.5 milliGRAM(s) IV Push every 4 hours PRN Moderate Pain (4 - 6)  naloxone Injectable 0.1 milliGRAM(s) IV Push every 3 minutes PRN For ANY of the following changes in patient status:  A. RR LESS THAN 10 breaths per minute, B. Oxygen saturation LESS THAN 90%, C. Sedation score of 6  ondansetron Injectable 4 milliGRAM(s) IV Push every 6 hours PRN Nausea      Imaging:

## 2024-03-01 NOTE — PROGRESS NOTE ADULT - ATTENDING COMMENTS
Pt seen/examined, overall doing well. Denies N/V. Positive loose BMs with some flatus.   Abd soft, significantly less distended, slightly tender to palpation. Wound c/d/i. Drain SSF    - adv to LRD  - cont ambulate

## 2024-03-01 NOTE — DISCHARGE NOTE PROVIDER - NSDCCPCAREPLAN_GEN_ALL_CORE_FT
PRINCIPAL DISCHARGE DIAGNOSIS  Diagnosis: Malignant neoplasm of sigmoid colon  Assessment and Plan of Treatment:

## 2024-03-01 NOTE — DISCHARGE NOTE PROVIDER - NSDCMRMEDTOKEN_GEN_ALL_CORE_FT
amLODIPine 5 mg oral tablet: 1 tab(s) orally once a day  Tylenol 500 mg oral tablet: 1 tab(s) orally prn   amLODIPine 5 mg oral tablet: 1 tab(s) orally once a day  oxyCODONE 5 mg oral tablet: 1 tab(s) orally every 8 hours as needed for  severe pain MDD: 3 tabs  Tylenol 500 mg oral tablet: 1 tab(s) orally prn

## 2024-03-01 NOTE — DISCHARGE NOTE PROVIDER - NSDCCPTREATMENT_GEN_ALL_CORE_FT
PRINCIPAL PROCEDURE  Procedure: Partial left colectomy  Findings and Treatment: WOUND CARE: Staples will be removed at follow up office visit. Leave the white steri strips in place, they will fall off on their own in approximately 5-7 days.  BATHING: Please do not submerge wound underwater. You may shower and/or sponge bathe.  ACTIVITY: No heavy lifting anything more than 10-15lbs or straining. Otherwise, you may return to your usual level of physical activity. If you are taking narcotic pain medication (such as Percocet), do NOT drive a car, operate machinery or make important decisions.  DIET: Low fiber diet  NOTIFY YOUR SURGEON IF: You have any bleeding that does not stop, any pus draining from your wound, any fever (over 100.4 F) or chills, persistent nausea/vomiting with inability to tolerate food or liquids, persistent diarrhea, or if your pain is not controlled on your discharge pain medications.  FOLLOW-UP:  1. Please call to make a follow-up appointment within one week of discharge   2. Please follow up with your primary care physician in one week regarding your hospitalization.

## 2024-03-01 NOTE — PROGRESS NOTE ADULT - ASSESSMENT
58 year old male patient S/P lap-assisted partial colectomy for colon cancer. Overnight patient spiked a low grade fever. Fever work up sent. Differential diagnosis includes atelectasis vs developing pneumonia    Plan:  - follow up fever work up  - Diet: CLD  - Pain control: continue standing tylenol and start iv dilaudid prn 5/10 mod/severe pain  - Continue IVF, will decrease rate when tolerating adequate PO   - ODALIS drain teaching   - Resume home meds  - Activity: Encourage OOBAT  - DVT ppx: SCD's & SQH      A Team Surgery  [id number]

## 2024-03-02 LAB
ANION GAP SERPL CALC-SCNC: 15 MMOL/L — HIGH (ref 7–14)
BUN SERPL-MCNC: 12 MG/DL — SIGNIFICANT CHANGE UP (ref 7–23)
CALCIUM SERPL-MCNC: 8.9 MG/DL — SIGNIFICANT CHANGE UP (ref 8.4–10.5)
CHLORIDE SERPL-SCNC: 100 MMOL/L — SIGNIFICANT CHANGE UP (ref 98–107)
CO2 SERPL-SCNC: 23 MMOL/L — SIGNIFICANT CHANGE UP (ref 22–31)
CREAT SERPL-MCNC: 0.82 MG/DL — SIGNIFICANT CHANGE UP (ref 0.5–1.3)
EGFR: 101 ML/MIN/1.73M2 — SIGNIFICANT CHANGE UP
GLUCOSE SERPL-MCNC: 101 MG/DL — HIGH (ref 70–99)
HCT VFR BLD CALC: 40.6 % — SIGNIFICANT CHANGE UP (ref 39–50)
HGB BLD-MCNC: 13.8 G/DL — SIGNIFICANT CHANGE UP (ref 13–17)
MAGNESIUM SERPL-MCNC: 2.1 MG/DL — SIGNIFICANT CHANGE UP (ref 1.6–2.6)
MCHC RBC-ENTMCNC: 30.9 PG — SIGNIFICANT CHANGE UP (ref 27–34)
MCHC RBC-ENTMCNC: 34 GM/DL — SIGNIFICANT CHANGE UP (ref 32–36)
MCV RBC AUTO: 91 FL — SIGNIFICANT CHANGE UP (ref 80–100)
NRBC # BLD: 0 /100 WBCS — SIGNIFICANT CHANGE UP (ref 0–0)
NRBC # FLD: 0 K/UL — SIGNIFICANT CHANGE UP (ref 0–0)
PHOSPHATE SERPL-MCNC: 3.3 MG/DL — SIGNIFICANT CHANGE UP (ref 2.5–4.5)
PLATELET # BLD AUTO: 196 K/UL — SIGNIFICANT CHANGE UP (ref 150–400)
POTASSIUM SERPL-MCNC: 3.3 MMOL/L — LOW (ref 3.5–5.3)
POTASSIUM SERPL-SCNC: 3.3 MMOL/L — LOW (ref 3.5–5.3)
RBC # BLD: 4.46 M/UL — SIGNIFICANT CHANGE UP (ref 4.2–5.8)
RBC # FLD: 12.5 % — SIGNIFICANT CHANGE UP (ref 10.3–14.5)
SODIUM SERPL-SCNC: 138 MMOL/L — SIGNIFICANT CHANGE UP (ref 135–145)
WBC # BLD: 6.53 K/UL — SIGNIFICANT CHANGE UP (ref 3.8–10.5)
WBC # FLD AUTO: 6.53 K/UL — SIGNIFICANT CHANGE UP (ref 3.8–10.5)

## 2024-03-02 RX ORDER — POTASSIUM CHLORIDE 20 MEQ
40 PACKET (EA) ORAL ONCE
Refills: 0 | Status: COMPLETED | OUTPATIENT
Start: 2024-03-02 | End: 2024-03-02

## 2024-03-02 RX ORDER — POTASSIUM CHLORIDE 20 MEQ
30 PACKET (EA) ORAL ONCE
Refills: 0 | Status: COMPLETED | OUTPATIENT
Start: 2024-03-02 | End: 2024-03-02

## 2024-03-02 RX ADMIN — Medication 975 MILLIGRAM(S): at 10:11

## 2024-03-02 RX ADMIN — OXYCODONE HYDROCHLORIDE 5 MILLIGRAM(S): 5 TABLET ORAL at 03:57

## 2024-03-02 RX ADMIN — Medication 30 MILLIEQUIVALENT(S): at 15:12

## 2024-03-02 RX ADMIN — Medication 975 MILLIGRAM(S): at 10:41

## 2024-03-02 RX ADMIN — OXYCODONE HYDROCHLORIDE 5 MILLIGRAM(S): 5 TABLET ORAL at 04:27

## 2024-03-02 RX ADMIN — Medication 975 MILLIGRAM(S): at 20:00

## 2024-03-02 RX ADMIN — AMLODIPINE BESYLATE 5 MILLIGRAM(S): 2.5 TABLET ORAL at 05:40

## 2024-03-02 RX ADMIN — Medication 40 MILLIEQUIVALENT(S): at 14:40

## 2024-03-02 RX ADMIN — Medication 975 MILLIGRAM(S): at 19:30

## 2024-03-02 NOTE — PROGRESS NOTE ADULT - SUBJECTIVE AND OBJECTIVE BOX
A Team Surgery Daily Progress Note    Subjective:   Patient seen at bedside this AM. Reports feeling well, mild abdominal pain responsive to pain regimen. +Flatus/+BM.   Tolerating diet without N/V. Ambulating and voiding appropriately. Denies chest pain, SOB. No other concerns reported.    24h Events:   - Overnight, no acute events    Objective:  Vital Signs  T(C): 37.1 (03-02 @ 05:00), Max: 37.1 (03-02 @ 05:00)  HR: 78 (03-02 @ 05:00) (72 - 81)  BP: 153/87 (03-02 @ 05:00) (131/78 - 176/95)  RR: 18 (03-02 @ 05:00) (16 - 18)  SpO2: 98% (03-02 @ 05:00) (96% - 99%)  03-01-24 @ 07:01  -  03-02-24 @ 07:00  --------------------------------------------------------  IN:  Total IN: 0 mL    OUT:    Bulb (mL): 175 mL    Voided (mL): 650 mL  Total OUT: 825 mL    Total NET: -825 mL          Physical Exam:  GEN: resting in bed comfortably in NAD  RESP: no increased WOB  ABD: soft, mild tenderness to palpation along midline incision; ODALIS serosanguinous  EXTR: warm, well-perfused without gross deformities; spontaneous movement in b/l U/L extrem  NEURO: AAOx4    Labs:                        13.8   6.53  )-----------( 196      ( 02 Mar 2024 04:30 )             40.6   03-02    138  |  100  |  12  ----------------------------<  101<H>  3.3<L>   |  23  |  0.82    Ca    8.9      02 Mar 2024 04:30  Phos  3.3     03-02  Mg     2.10     03-02      CAPILLARY BLOOD GLUCOSE          Medications:   MEDICATIONS  (STANDING):  amLODIPine   Tablet 5 milliGRAM(s) Oral daily  heparin   Injectable 5000 Unit(s) SubCutaneous every 8 hours  influenza   Vaccine 0.5 milliLiter(s) IntraMuscular once    MEDICATIONS  (PRN):  acetaminophen     Tablet .. 975 milliGRAM(s) Oral every 6 hours PRN Mild Pain (1 - 3)  naloxone Injectable 0.1 milliGRAM(s) IV Push every 3 minutes PRN For ANY of the following changes in patient status:  A. RR LESS THAN 10 breaths per minute, B. Oxygen saturation LESS THAN 90%, C. Sedation score of 6  ondansetron Injectable 4 milliGRAM(s) IV Push every 6 hours PRN Nausea  oxyCODONE    IR 2.5 milliGRAM(s) Oral every 4 hours PRN Moderate Pain (4 - 6)  oxyCODONE    IR 5 milliGRAM(s) Oral every 4 hours PRN Severe Pain (7 - 10)      Imaging:

## 2024-03-02 NOTE — PROGRESS NOTE ADULT - ATTENDING COMMENTS
Patient seen/examined, overall feeling well. Tolerating diet, positive bowel function. No abd pain.    - poss d/c home later today  - remove drain prior to discharge  - f/u in the office 2 wks Patient seen/examined, overall feeling well. Tolerating diet, positive bowel function. No abd pain.    - poss d/c home in AM  - remove drain prior to discharge  - f/u in the office 2 wks

## 2024-03-03 ENCOUNTER — TRANSCRIPTION ENCOUNTER (OUTPATIENT)
Age: 59
End: 2024-03-03

## 2024-03-03 VITALS
DIASTOLIC BLOOD PRESSURE: 86 MMHG | RESPIRATION RATE: 18 BRPM | OXYGEN SATURATION: 97 % | TEMPERATURE: 99 F | SYSTOLIC BLOOD PRESSURE: 153 MMHG | HEART RATE: 87 BPM

## 2024-03-03 LAB
ANION GAP SERPL CALC-SCNC: 12 MMOL/L — SIGNIFICANT CHANGE UP (ref 7–14)
BUN SERPL-MCNC: 14 MG/DL — SIGNIFICANT CHANGE UP (ref 7–23)
CALCIUM SERPL-MCNC: 9.1 MG/DL — SIGNIFICANT CHANGE UP (ref 8.4–10.5)
CHLORIDE SERPL-SCNC: 102 MMOL/L — SIGNIFICANT CHANGE UP (ref 98–107)
CO2 SERPL-SCNC: 24 MMOL/L — SIGNIFICANT CHANGE UP (ref 22–31)
CREAT SERPL-MCNC: 0.81 MG/DL — SIGNIFICANT CHANGE UP (ref 0.5–1.3)
EGFR: 102 ML/MIN/1.73M2 — SIGNIFICANT CHANGE UP
GLUCOSE SERPL-MCNC: 94 MG/DL — SIGNIFICANT CHANGE UP (ref 70–99)
HCT VFR BLD CALC: 39.5 % — SIGNIFICANT CHANGE UP (ref 39–50)
HGB BLD-MCNC: 13.4 G/DL — SIGNIFICANT CHANGE UP (ref 13–17)
MAGNESIUM SERPL-MCNC: 2.2 MG/DL — SIGNIFICANT CHANGE UP (ref 1.6–2.6)
MCHC RBC-ENTMCNC: 31.1 PG — SIGNIFICANT CHANGE UP (ref 27–34)
MCHC RBC-ENTMCNC: 33.9 GM/DL — SIGNIFICANT CHANGE UP (ref 32–36)
MCV RBC AUTO: 91.6 FL — SIGNIFICANT CHANGE UP (ref 80–100)
NRBC # BLD: 0 /100 WBCS — SIGNIFICANT CHANGE UP (ref 0–0)
NRBC # FLD: 0 K/UL — SIGNIFICANT CHANGE UP (ref 0–0)
PHOSPHATE SERPL-MCNC: 3.6 MG/DL — SIGNIFICANT CHANGE UP (ref 2.5–4.5)
PLATELET # BLD AUTO: 211 K/UL — SIGNIFICANT CHANGE UP (ref 150–400)
POTASSIUM SERPL-MCNC: 3.6 MMOL/L — SIGNIFICANT CHANGE UP (ref 3.5–5.3)
POTASSIUM SERPL-SCNC: 3.6 MMOL/L — SIGNIFICANT CHANGE UP (ref 3.5–5.3)
RBC # BLD: 4.31 M/UL — SIGNIFICANT CHANGE UP (ref 4.2–5.8)
RBC # FLD: 12.5 % — SIGNIFICANT CHANGE UP (ref 10.3–14.5)
SODIUM SERPL-SCNC: 138 MMOL/L — SIGNIFICANT CHANGE UP (ref 135–145)
WBC # BLD: 6.62 K/UL — SIGNIFICANT CHANGE UP (ref 3.8–10.5)
WBC # FLD AUTO: 6.62 K/UL — SIGNIFICANT CHANGE UP (ref 3.8–10.5)

## 2024-03-03 RX ORDER — OXYCODONE HYDROCHLORIDE 5 MG/1
1 TABLET ORAL
Qty: 9 | Refills: 0
Start: 2024-03-03 | End: 2024-03-05

## 2024-03-03 RX ADMIN — Medication 975 MILLIGRAM(S): at 01:42

## 2024-03-03 RX ADMIN — Medication 975 MILLIGRAM(S): at 02:12

## 2024-03-03 RX ADMIN — Medication 975 MILLIGRAM(S): at 08:23

## 2024-03-03 RX ADMIN — Medication 975 MILLIGRAM(S): at 08:53

## 2024-03-03 RX ADMIN — AMLODIPINE BESYLATE 5 MILLIGRAM(S): 2.5 TABLET ORAL at 06:15

## 2024-03-03 NOTE — PROGRESS NOTE ADULT - SUBJECTIVE AND OBJECTIVE BOX
Surgery Progress Note    OVERNIGHT EVENTS: NAEO    SUBJECTIVE: Pt seen and examined at bedside. Patient comfortable and in no-apparent distress. Pain is controlled. Patient tolerating a LRD diet without N/v. +/+    Vital Signs Last 24 Hrs  T(C): 36.8 (03 Mar 2024 06:00), Max: 37 (03 Mar 2024 02:08)  T(F): 98.2 (03 Mar 2024 06:00), Max: 98.6 (03 Mar 2024 02:08)  HR: 73 (03 Mar 2024 06:00) (73 - 91)  BP: 131/70 (03 Mar 2024 06:00) (131/70 - 145/72)  BP(mean): --  RR: 18 (03 Mar 2024 06:00) (17 - 18)  SpO2: 98% (03 Mar 2024 06:00) (97% - 100%)    Parameters below as of 03 Mar 2024 06:00  Patient On (Oxygen Delivery Method): room air        PHYSICAL EXAM:  General Appearance: Appears well, NAD  Respiratory: No labored breathing  CV: Pulse regularly present  Abdomen: Soft distended, nontender, incision c/d/i, ODALIS intact with SS fluid     INs and OUTs:    03-01-24 @ 07:01  -  03-02-24 @ 07:00  --------------------------------------------------------  IN: 1480 mL / OUT: 825 mL / NET: 655 mL    03-02-24 @ 07:01  -  03-03-24 @ 06:41  --------------------------------------------------------  IN: 720 mL / OUT: 965 mL / NET: -245 mL        LABS:                        13.8   6.53  )-----------( 196      ( 02 Mar 2024 04:30 )             40.6     03-02    138  |  100  |  12  ----------------------------<  101<H>  3.3<L>   |  23  |  0.82    Ca    8.9      02 Mar 2024 04:30  Phos  3.3     03-02  Mg     2.10     03-02        Urinalysis Basic - ( 02 Mar 2024 04:30 )    Color: x / Appearance: x / SG: x / pH: x  Gluc: 101 mg/dL / Ketone: x  / Bili: x / Urobili: x   Blood: x / Protein: x / Nitrite: x   Leuk Esterase: x / RBC: x / WBC x   Sq Epi: x / Non Sq Epi: x / Bacteria: x

## 2024-03-03 NOTE — DISCHARGE NOTE NURSING/CASE MANAGEMENT/SOCIAL WORK - NSDCPNINST_GEN_ALL_CORE
Keep incision and dressing clean and dry, free from moisture. Monitor for signs/symptoms of infections including but not limited to fever greater than 100.4, chills, change in mental status, purulent drainage from incision site. Contact provider if any of these signs are observed.

## 2024-03-03 NOTE — PROGRESS NOTE ADULT - ASSESSMENT
58 year old man s/p lap assisted partial colectomy, POD#6 recovering well on the floors.    PLAN:  - Diet: LRD  - Activity: encourage Out of bed, IS  - Pain control PO PRN  - VTE prophylaxis with Heparin subcutaneous  - can remove drain prior to D/C  - dispo: home today      A Team Surgery [id number]

## 2024-03-03 NOTE — PROGRESS NOTE ADULT - ASSESSMENT
58 year old male patient S/P lap-assisted partial colectomy for colon cancer. Recovering appropriately on the floor. Patient ready for discharge.     Plan:  - Diet: LRD  - Pain control: PO pain regimen  - Discontinue ODALIS drain prior to d/c  - continue  home meds  - Activity: Encourage OOBAT  - DVT ppx: SCD's & SQH  - dispo: home today       A Team Surgery  [id number]

## 2024-03-03 NOTE — DISCHARGE NOTE NURSING/CASE MANAGEMENT/SOCIAL WORK - PATIENT PORTAL LINK FT
You can access the FollowMyHealth Patient Portal offered by [facility] by registering at the following website: [email or web address]. By joining [facility]’s FollowMyHealth portal, you will also be able to view your health information using other applications (apps) compatible with our system.

## 2024-03-03 NOTE — PROGRESS NOTE ADULT - PROVIDER SPECIALTY LIST ADULT
Pain Medicine
Surgery
Pain Medicine
Pain Medicine
Surgery
Colorectal Surgery
Colorectal Surgery
Surgery

## 2024-03-03 NOTE — PROGRESS NOTE ADULT - SUBJECTIVE AND OBJECTIVE BOX
DATE OF SERVICE: 03-03-24 @ 12:13    INTERVAL HPI/OVERNIGHT EVENTS: Patient seen and examined, resting comfortably at bedside awake and alert. No acute events overnight. Patient reports tolerating diet without nausea, vomiting. Admits to passing flatus and having bowel movements. Patient endorses voiding without issues and has been out of bed and ambulating. Denies fever, chills, SOB, or chest pain. Pain well controlled.     STATUS POST:  lap assisted partial colectomy    POST OPERATIVE DAY #: 6    MEDICATIONS  (STANDING):  amLODIPine   Tablet 5 milliGRAM(s) Oral daily  heparin   Injectable 5000 Unit(s) SubCutaneous every 8 hours  influenza   Vaccine 0.5 milliLiter(s) IntraMuscular once    MEDICATIONS  (PRN):  acetaminophen     Tablet .. 975 milliGRAM(s) Oral every 6 hours PRN Mild Pain (1 - 3)  naloxone Injectable 0.1 milliGRAM(s) IV Push every 3 minutes PRN For ANY of the following changes in patient status:  A. RR LESS THAN 10 breaths per minute, B. Oxygen saturation LESS THAN 90%, C. Sedation score of 6  ondansetron Injectable 4 milliGRAM(s) IV Push every 6 hours PRN Nausea  oxyCODONE    IR 2.5 milliGRAM(s) Oral every 4 hours PRN Moderate Pain (4 - 6)  oxyCODONE    IR 5 milliGRAM(s) Oral every 4 hours PRN Severe Pain (7 - 10)      Vital Signs Last 24 Hrs  T(C): 37 (03 Mar 2024 10:24), Max: 37 (03 Mar 2024 02:08)  T(F): 98.6 (03 Mar 2024 10:24), Max: 98.6 (03 Mar 2024 02:08)  HR: 87 (03 Mar 2024 10:24) (73 - 91)  BP: 153/86 (03 Mar 2024 10:24) (131/70 - 153/86)  BP(mean): --  RR: 18 (03 Mar 2024 10:24) (17 - 18)  SpO2: 97% (03 Mar 2024 10:24) (97% - 100%)    Parameters below as of 03 Mar 2024 10:24  Patient On (Oxygen Delivery Method): room air      I&O's Detail    02 Mar 2024 07:01  -  03 Mar 2024 07:00  --------------------------------------------------------  IN:    Oral Fluid: 720 mL  Total IN: 720 mL    OUT:    Bulb (mL): 65 mL    IV PiggyBack: 0 mL    Voided (mL): 900 mL  Total OUT: 965 mL    Total NET: -245 mL            Physical Exam:  General: WN/WD NAD  Respiratory: airway patent, respirations unlabored, no increased WoB  Neurology: A&Ox3, nonfocal, CASSIDY x 4  Abdominal: Soft, NT, ND, no rebounding or guarding  Incision: CDI no surrounding edema or erythema  ODALIS Drain functioning well with SSF output      LABS:                        13.4   6.62  )-----------( 211      ( 03 Mar 2024 07:32 )             39.5     03-03    138  |  102  |  14  ----------------------------<  94  3.6   |  24  |  0.81    Ca    9.1      03 Mar 2024 07:32  Phos  3.6     03-03  Mg     2.20     03-03        Urinalysis Basic - ( 03 Mar 2024 07:32 )    Color: x / Appearance: x / SG: x / pH: x  Gluc: 94 mg/dL / Ketone: x  / Bili: x / Urobili: x   Blood: x / Protein: x / Nitrite: x   Leuk Esterase: x / RBC: x / WBC x   Sq Epi: x / Non Sq Epi: x / Bacteria: x

## 2024-03-05 LAB
CULTURE RESULTS: SIGNIFICANT CHANGE UP
CULTURE RESULTS: SIGNIFICANT CHANGE UP
SPECIMEN SOURCE: SIGNIFICANT CHANGE UP
SPECIMEN SOURCE: SIGNIFICANT CHANGE UP

## 2024-03-19 LAB — SURGICAL PATHOLOGY STUDY: SIGNIFICANT CHANGE UP

## 2024-05-01 NOTE — PATIENT PROFILE ADULT - NS PRO AD PATIENT TYPE
Allergy and Immunology  New Patient Clinic Note    Date: 5/1/2024  Chief Complaint   Patient presents with    Allergies     Referred by: [facility]  [address] [place] [address]    History  Sanchez Oneal is a 41 y.o. male being seen as a New Patient today.    Chronic Allergic Rhinitis   Post Nasal Drip   - Onset: Adulthood - does not recall issues in childhood   - Symptoms: Congestion, rhinorrhea, sneezing  - Suspected triggers include: prior positive blood testing   - Pattern: Perennial with Seasonal Exacerbations  - Medications: Prior use of intranasal sprays but without relief  - Rincon noting patient did not use the correct technique     Chronic or Inducible Urticaria  - No hx of chronic urticaria     Asthma   - No hx of asthma    CRSwNP  - No hx of CRSwNP     Eczema   - No hx of eczema     Eosinophilic Esophagitis  - No hx of eosinophilic esophagitis     Food Allergy  - No hx of food allergy     Drug Allergy  - No hx of drug allergy     Recurrent Infections  - No hx of recurrent infections     Venom Allergy  - No hx of venom allergy     Environmental History  Pets in the home: dogs (1).  Radha: hardwood floors  Climate Control: air filters and central or room air conditioning  Dust Mite Controls: Dust mite controls are not in place.  Smoking:  no  Tobacco Smoke in Home: no  Occupation:   Prior  service with deployment in [place]     Allergies, PMH, PSH, Social, and Family History were reviewed.    Review of patient's allergies indicates:  No Known Allergies   No past medical history on file.  Past Surgical History:   Procedure Laterality Date    HIP SURGERY Right 2018    KNEE ARTHROSCOPY Left 2018     Social History     Social History Narrative    Not on file     S/he reports that he has never smoked. He has never used smokeless tobacco. He reports that he does not drink alcohol and does not use drugs.    No current outpatient medications on file prior  to visit.     No current facility-administered medications on file prior to visit.     Physical Examination  Vitals:    05/01/24 1414   BP: (!) 145/74   Pulse: 66   Resp: 20   Temp: 97.7 °F (36.5 °C)     GENERAL:  male in no apparent distress and well developed and well nourished  HEAD:  Normocephalic, without obvious abnormality, atraumatic  EYES: sclera anicteric, conjunctiva normochromic  EARS: normal TM's and external ear canals both ears  NOSE: without erythema or discharge, clear and copious discharge, turbinates normal    OROPHARYNX: moist mucous membranes without erythema, exudates or petechiae, clear post-nasal drainage present  LYMPH NODES: normal, supple, no lymphadenopathy  LUNGS: clear to auscultation, no wheezes, rales or rhonchi, symmetric air entry.  HEART: normal rate, regular rhythm, normal S1, S2, no murmurs, rubs, clicks or gallops.  ABDOMEN: soft, nontender, nondistended, no masses or organomegaly.  MUSCULOSKELETAL: no gross joint deformity or swelling.  NEURO: alert, oriented, normal speech, no focal findings or movement disorder noted.  SKIN: normal coloration and turgor, no rashes, no suspicious skin lesions noted.     Allergy Skin Tests  Allergy skin prick tests to inhalants were negative to house dust mites, mold spores, cat dander, dog dander, horse dander, cockroach, tree pollen, grass pollen, and weed pollen with INADEQUATE histamine response and negative control. Test is NOT valid at this time. Proceed with serum IgE testing   - See media for results    Assessment/Plan:   Problem List Items Addressed This Visit          ENT    Chronic allergic rhinitis - Primary    Current Assessment & Plan     - Not controlled at this time   - Ordered Flonase 1 SEN BID   - Ordered Astelin 1 SEN BID   - Educated on proper use of intranasal sprays   - [facility]   - Will continue to monitor and reassess          Relevant Orders    Allergen Profile, Zone 6    PND  (post-nasal drip)     Follow up:  Follow up in about 6 weeks (around 6/12/2024).    Swati Cast MD   [facility]  Allergy and Immunology        Health Care Proxy (HCP)
